# Patient Record
Sex: FEMALE | Race: WHITE | Employment: OTHER | URBAN - METROPOLITAN AREA
[De-identification: names, ages, dates, MRNs, and addresses within clinical notes are randomized per-mention and may not be internally consistent; named-entity substitution may affect disease eponyms.]

---

## 2017-06-06 ENCOUNTER — OFFICE VISIT (OUTPATIENT)
Dept: FAMILY MEDICINE | Facility: CLINIC | Age: 77
End: 2017-06-06
Payer: MEDICARE

## 2017-06-06 VITALS
DIASTOLIC BLOOD PRESSURE: 72 MMHG | TEMPERATURE: 97.6 F | OXYGEN SATURATION: 98 % | HEIGHT: 67 IN | WEIGHT: 170 LBS | SYSTOLIC BLOOD PRESSURE: 128 MMHG | BODY MASS INDEX: 26.68 KG/M2 | HEART RATE: 62 BPM

## 2017-06-06 DIAGNOSIS — D51.0 PERNICIOUS ANEMIA: ICD-10-CM

## 2017-06-06 DIAGNOSIS — G47.00 INSOMNIA, UNSPECIFIED TYPE: ICD-10-CM

## 2017-06-06 DIAGNOSIS — M62.81 GENERALIZED MUSCLE WEAKNESS: Primary | ICD-10-CM

## 2017-06-06 DIAGNOSIS — E55.9 VITAMIN D DEFICIENCY: ICD-10-CM

## 2017-06-06 LAB
ALBUMIN SERPL-MCNC: 3.7 G/DL (ref 3.4–5)
ALP SERPL-CCNC: 69 U/L (ref 40–150)
ALT SERPL W P-5'-P-CCNC: 18 U/L (ref 0–50)
ANION GAP SERPL CALCULATED.3IONS-SCNC: 3 MMOL/L (ref 3–14)
AST SERPL W P-5'-P-CCNC: 16 U/L (ref 0–45)
BILIRUB SERPL-MCNC: 0.7 MG/DL (ref 0.2–1.3)
BUN SERPL-MCNC: 20 MG/DL (ref 7–30)
CALCIUM SERPL-MCNC: 9.3 MG/DL (ref 8.5–10.1)
CHLORIDE SERPL-SCNC: 109 MMOL/L (ref 94–109)
CO2 SERPL-SCNC: 30 MMOL/L (ref 20–32)
CREAT SERPL-MCNC: 0.81 MG/DL (ref 0.52–1.04)
ERYTHROCYTE [DISTWIDTH] IN BLOOD BY AUTOMATED COUNT: 12.6 % (ref 10–15)
GFR SERPL CREATININE-BSD FRML MDRD: 68 ML/MIN/1.7M2
GLUCOSE SERPL-MCNC: 104 MG/DL (ref 70–99)
HCT VFR BLD AUTO: 41.2 % (ref 35–47)
HGB BLD-MCNC: 14.1 G/DL (ref 11.7–15.7)
MCH RBC QN AUTO: 33.7 PG (ref 26.5–33)
MCHC RBC AUTO-ENTMCNC: 34.2 G/DL (ref 31.5–36.5)
MCV RBC AUTO: 99 FL (ref 78–100)
PLATELET # BLD AUTO: 166 10E9/L (ref 150–450)
POTASSIUM SERPL-SCNC: 4 MMOL/L (ref 3.4–5.3)
PROT SERPL-MCNC: 7 G/DL (ref 6.8–8.8)
RBC # BLD AUTO: 4.18 10E12/L (ref 3.8–5.2)
SODIUM SERPL-SCNC: 142 MMOL/L (ref 133–144)
TSH SERPL DL<=0.005 MIU/L-ACNC: 3.13 MU/L (ref 0.4–4)
VIT B12 SERPL-MCNC: 336 PG/ML (ref 193–986)
WBC # BLD AUTO: 4.7 10E9/L (ref 4–11)

## 2017-06-06 PROCEDURE — 85027 COMPLETE CBC AUTOMATED: CPT | Performed by: PHYSICIAN ASSISTANT

## 2017-06-06 PROCEDURE — 84443 ASSAY THYROID STIM HORMONE: CPT | Performed by: PHYSICIAN ASSISTANT

## 2017-06-06 PROCEDURE — 80053 COMPREHEN METABOLIC PANEL: CPT | Performed by: PHYSICIAN ASSISTANT

## 2017-06-06 PROCEDURE — 99214 OFFICE O/P EST MOD 30 MIN: CPT | Performed by: PHYSICIAN ASSISTANT

## 2017-06-06 PROCEDURE — 82306 VITAMIN D 25 HYDROXY: CPT | Performed by: PHYSICIAN ASSISTANT

## 2017-06-06 PROCEDURE — 36415 COLL VENOUS BLD VENIPUNCTURE: CPT | Performed by: PHYSICIAN ASSISTANT

## 2017-06-06 PROCEDURE — 82607 VITAMIN B-12: CPT | Performed by: PHYSICIAN ASSISTANT

## 2017-06-06 NOTE — PATIENT INSTRUCTIONS
Insomnia  Insomnia refers to a difficulty going to sleep or staying asleep, or both. Insomnia has many causes, including anxiety, stress, depression, chronic pain, sleeping cycles out of balance due to working night shifts or excess napping during the day, and a condition called  sleep apnea . Insomnia can be a side effect from stimulant medicines such as decongestants, asthma inhalers and pills, diet pills, and illegal drugs such as speed, crank, crack, and PCP.  Home Care:  1. Review your medicines with your doctor or pharmacist to find out if they can cause insomnia.  2. Caffeine, smoking and alcohol also affect sleep. Limit your daily use and do not use these before bedtime. Alcohol may make you sleepy at first, but as its effects wear off, you may awaken a few hours later and have trouble returning to sleep.  3. Do not exercise, eat or drink large amounts of liquid within 2 hours of your bedtime.  4. Improve your sleep habits. Have a fixed bed and wake-up time. Try to keep noise, light and heat in your bedroom at a comfortable level. Try using earplugs or eyeshades if needed.   5. Avoid watching TV in bed.  6. If you do not fall asleep within 30 minutes, try to relax by reading or listening to soft music.  7. Limit daytime napping to one 30 minute period, early in the day.  8. Get regular exercise. Find other ways to lessen your stress level.  9. If a medicine was prescribed to help reset your sleep patterns, take it as directed. Sleeping pills are intended for short-term use, only. If taken for too long, the effect wears off while the risk of physical addiction and psychological dependence increases.  Follow-Up  with your doctor or as directed by our staff if you feel that your insomnia is not responding to the above measures.  Get Prompt Medical Attention  if any of the following occur:    Extreme restlessness or irritability    Confusion or hallucinations (seeing or hearing things that are not  there)    Anxiety, depression    Several days without sleeping    6528-1045 [employer]. [address] [place] [address], [place] [address]. All rights reserved. This information is not intended as a substitute for professional medical care. Always follow your healthcare professional's instructions.

## 2017-06-06 NOTE — PROGRESS NOTES
SUBJECTIVE:                                                    Barb Marie is a 77 year old female who presents to clinic today for the following health issues:    Medication Followup of cyanocobalamin (VITAMIN B12) 1000 MCG/ML injection    Taking Medication as prescribed: yes    Side Effects:  None    Medication Helping Symptoms:  Yes seems to help but having increased muscle weakness, feels unstable and weak at times, and legs collapse.  She has fallen at home, but can sense when it is coming - no fall with injury      Patient reported muscle weakness at last office visit as well.  States that symptoms have not worsened over time.  Notices that she has a hard time holding herself up if she sits without a backrest (such as when she tries sitting in the grass).  Also noticed leg weakness when she walks for longer periods of time. For example, [place] the [place] and suddenly had to sit down. If she rests for 5-10 minutes, she is able to get up and walk again and feels fine.  Denies numbness or tingling. No muscle aches or joint pains.    Activity level is pretty minimal during the winter. She is part of a sewing club, which involves a lot of sitting down, then standing up and walking inside. She does this 3-4 days per week.    Does go for walks fairly often in the summer.    States that she can sense when the weakness is coming on, and she will sit down to avoid a fall.    No CP, SOB, dizziness.    Insomnia  Duration of complaint: 1 year   Description:   Time to fall asleep (sleep latency): 30 minutes or more  Middle of night awakening:  YES  Early morning awakening:  YES  Progression of Symptoms:  same  Accompanying Signs & Symptoms:  Daytime sleepiness/napping: no. Rarely takes naps  Excessive snoring/apnea: States that she snores, but no known apnea  Restless legs: no  Frequent urination: no  Chronic pain:  no  History: Prior Insomnia: no  Precipitating factors:   New  stressful situation: no  Caffeine intake: no  OTC decongestants: no  Any new medications: no  Alleviating factors: Self medicating (alcohol, etc.):  not applicable  Therapies Tried and outcome: Getting out of bed when she can't fall asleep.    Reports having a hard time falling asleep.  If she is not asleep within 30 minutes, she will get up and go watch TV  Does watch TV before bed  Reports that if she can fall asleep pretty quickly she will get 5 hours of sleep at night. Gets up early to have breakfast with neighbor boy.  Rarely takes naps during the day.  States that she sleeps hard when she does sleep, but that she doesn't dream very often  Has experienced restless legs before, but states this happens very rarely. Gets muscle cramps at night approx 2-3 times per year  No nocturia    Problem list and histories reviewed & adjusted, as indicated.  Additional history: as documented    Patient Active Problem List   Diagnosis     Mixed hyperlipidemia     HYPERLIPIDEMIA LDL GOAL <130     Advanced directives, counseling/discussion     Pernicious anemia--Vitamin B12 injections monthly     Glaucoma associated with underlying disease     Past Surgical History:   Procedure Laterality Date     CHOLECYSTECTOMY  2008    At [facility]     TONSILLECTOMY & ADENOIDECTOMY      As a child       Social History   Substance Use Topics     Smoking status: Never Smoker     Smokeless tobacco: Never Used     Alcohol use No     Family History   Problem Relation Age of Onset     HEART DISEASE Father          Current Outpatient Prescriptions   Medication Sig Dispense Refill     cyanocobalamin (VITAMIN B12) 1000 MCG/ML injection Inject 1 mL (1,000 mcg) into the muscle every 30 days 12 mL 0     COSOPT 2-0.5 % OP SOLN instill 1 drop ou BID  0     Allergies   Allergen Reactions     Atorvastatin GI Disturbance     Pt is allergic to Lipitor.     Hmg-Coa-R Inhibitors      jaundice       ROS:  Constitutional, HEENT, cardiovascular,  "pulmonary, gi and gu systems are negative, except as otherwise noted.    OBJECTIVE:                                                    /72 (BP Location: Right arm, Patient Position: Chair, Cuff Size: Adult Regular)  Pulse 62  Temp 97.6  F (36.4  C) (Oral)  Ht 5' 7\" (1.702 m)  Wt 170 lb (77.1 kg)  SpO2 98%  BMI 26.63 kg/m2  Body mass index is 26.63 kg/(m^2).  GENERAL: healthy, alert and no distress  RESP: lungs clear to auscultation - no rales, rhonchi or wheezes  CV: regular rate and rhythm, normal S1 S2, no S3 or S4, no murmur, click or rub, no peripheral edema and peripheral pulses strong  MS: no gross musculoskeletal defects noted, no edema  PSYCH: mentation appears normal, affect normal/bright    Diagnostic Test Results:  No results found for this or any previous visit (from the past 24 hour(s)).     ASSESSMENT/PLAN:                                                      1. Generalized muscle weakness  Will check labs today. Possibly related to deconditioning. Encouraged her to continue walking, and to continue to sit down when she needs to. Discussed importance of avoiding falls.   - CBC with platelets  - Comprehensive metabolic panel (BMP + Alb, Alk Phos, ALT, AST, Total. Bili, TP)  - TSH with free T4 reflex  - Vitamin B12  - Vitamin D Deficiency    2. Vitamin D deficiency   - Vitamin D Deficiency    3. Insomnia, unspecified type  Reviewed sleep hygiene techniques. Television at night may be contributing to sleep issues. Not interested in medication at this time. Follow-up if symptoms do not improve with sleep hygiene techniques over the next month.    4. Pernicious anemia--Vitamin B12 injections monthly  Check B12 level today.     See Patient Instructions    Janny Martino PA-C  [facility]      "

## 2017-06-06 NOTE — NURSING NOTE
"Chief Complaint   Patient presents with     Recheck Medication     Initial /72 (BP Location: Right arm, Patient Position: Chair, Cuff Size: Adult Regular)  Pulse 62  Temp 97.6  F (36.4  C) (Oral)  Ht 5' 7\" (1.702 m)  Wt 170 lb (77.1 kg)  SpO2 98%  BMI 26.63 kg/m2 Estimated body mass index is 26.63 kg/(m^2) as calculated from the following:    Height as of this encounter: 5' 7\" (1.702 m).    Weight as of this encounter: 170 lb (77.1 kg).  BP completed using cuff size regular right Arm  Tatyanayanet Galan CMA    "

## 2017-06-06 NOTE — LETTER
[facility]     [address] [place] [address]                                           [phone]   June 16, 2017    Barb Densonpp  [address] [place] [address]      Dear Barb,    Here is a summary of your recent test results:    Liver and gallbladder tests are normal. (ALT,AST, Alk phos, bilirubin), kidney function is normal (Cr, GFR), Sodium is normal, Potassium is normal, Calcium is normal, Glucose is slightly elevated.   -TSH (thyroid stimulating hormone) level is normal which indicates normal thyroid function.   -Normal red blood cell (hgb) levels, normal white blood cell count and normal platelet levels.   -Vitamin D is at the low end of normal. This is often called Vitamin D insufficiency. I recommend taking 2,000-4,000 units of an over-the-counter Vitamin D3 supplement once daily to improve your level.   -Vitamin B12 level is normal. Please continue with your monthly B12 injections.     Your test results are enclosed.      Thank you for choosing [facility].  We appreciate the opportunity to serve you and look forward to supporting your healthcare needs in the future.    If you have any questions or concerns, please call me or my staff at [phone].    Best regards,  LEANDRA Monterroso      Results for orders placed or performed in visit on 06/06/17   CBC with platelets   Result Value Ref Range    WBC 4.7 4.0 - 11.0 10e9/L    RBC Count 4.18 3.8 - 5.2 10e12/L    Hemoglobin 14.1 11.7 - 15.7 g/dL    Hematocrit 41.2 35.0 - 47.0 %    MCV 99 78 - 100 fl    MCH 33.7 (H) 26.5 - 33.0 pg    MCHC 34.2 31.5 - 36.5 g/dL    RDW 12.6 10.0 - 15.0 %    Platelet Count 166 150 - 450 10e9/L   Comprehensive metabolic panel (BMP + Alb, Alk Phos, ALT, AST, Total. Bili, TP)   Result Value Ref Range    Sodium 142 133 - 144 mmol/L    Potassium 4.0 3.4 - 5.3 mmol/L    Chloride 109 94 - 109 mmol/L    Carbon Dioxide 30 20 - 32 mmol/L    Anion Gap 3 3 - 14 mmol/L     Glucose 104 (H) 70 - 99 mg/dL    Urea Nitrogen 20 7 - 30 mg/dL    Creatinine 0.81 0.52 - 1.04 mg/dL    GFR Estimate 68 >60 mL/min/1.7m2    GFR Estimate If Black 82 >60 mL/min/1.7m2    Calcium 9.3 8.5 - 10.1 mg/dL    Bilirubin Total 0.7 0.2 - 1.3 mg/dL    Albumin 3.7 3.4 - 5.0 g/dL    Protein Total 7.0 6.8 - 8.8 g/dL    Alkaline Phosphatase 69 40 - 150 U/L    ALT 18 0 - 50 U/L    AST 16 0 - 45 U/L   TSH with free T4 reflex   Result Value Ref Range    TSH 3.13 0.40 - 4.00 mU/L   Vitamin B12   Result Value Ref Range    Vitamin B12 336 193 - 986 pg/mL   Vitamin D Deficiency   Result Value Ref Range    Vitamin D Deficiency screening 25 20 - 75 ug/L

## 2017-06-06 NOTE — MR AVS SNAPSHOT
After Visit Summary   6/6/2017    Barb Maire    MRN: [id number]           Patient Information     Date Of Birth          1940        Visit Information        Provider Department      6/6/2017 10:00 AM Janny Martino PA-C [facility] [place]        Today's Diagnoses     Generalized muscle weakness    -  1      Care Instructions      Insomnia  Insomnia refers to a difficulty going to sleep or staying asleep, or both. Insomnia has many causes, including anxiety, stress, depression, chronic pain, sleeping cycles out of balance due to working night shifts or excess napping during the day, and a condition called  sleep apnea . Insomnia can be a side effect from stimulant medicines such as decongestants, asthma inhalers and pills, diet pills, and illegal drugs such as speed, crank, crack, and PCP.  Home Care:  1. Review your medicines with your doctor or pharmacist to find out if they can cause insomnia.  2. Caffeine, smoking and alcohol also affect sleep. Limit your daily use and do not use these before bedtime. Alcohol may make you sleepy at first, but as its effects wear off, you may awaken a few hours later and have trouble returning to sleep.  3. Do not exercise, eat or drink large amounts of liquid within 2 hours of your bedtime.  4. Improve your sleep habits. Have a fixed bed and wake-up time. Try to keep noise, light and heat in your bedroom at a comfortable level. Try using earplugs or eyeshades if needed.   5. Avoid watching TV in bed.  6. If you do not fall asleep within 30 minutes, try to relax by reading or listening to soft music.  7. Limit daytime napping to one 30 minute period, early in the day.  8. Get regular exercise. Find other ways to lessen your stress level.  9. If a medicine was prescribed to help reset your sleep patterns, take it as directed. Sleeping pills are intended for short-term use, only. If taken for too long, the effect wears off while the risk of  "physical addiction and psychological dependence increases.  Follow-Up  with your doctor or as directed by our staff if you feel that your insomnia is not responding to the above measures.  Get Prompt Medical Attention  if any of the following occur:    Extreme restlessness or irritability    Confusion or hallucinations (seeing or hearing things that are not there)    Anxiety, depression    Several days without sleeping    6059-6074 [employer]. [address] [place] [address]. All rights reserved. This information is not intended as a substitute for professional medical care. Always follow your healthcare professional's instructions.                Follow-ups after your visit        Who to contact     If you have questions or need follow up information about today's clinic visit or your schedule please contact [facility] [place] directly at [phone].  Normal or non-critical lab and imaging results will be communicated to you by MyChart, letter or phone within 4 business days after the clinic has received the results. If you do not hear from us within 7 days, please contact the clinic through [employer] or phone. If you have a critical or abnormal lab result, we will notify you by phone as soon as possible.  Submit refill requests through [employer] or call your pharmacy and they will forward the refill request to us. Please allow 3 business days for your refill to be completed.          Additional Information About Your Visit        [employer] Information     [employer] lets you send messages to your doctor, view your test results, renew your prescriptions, schedule appointments and more. To sign up, go to [email or web address] . Click on \"Log in\" on the left side of the screen, which will take you to the Welcome page. Then click on \"Sign up Now\" on the right side of the page.     You will be asked to enter the access code listed below, as well as some personal information. Please follow the " "directions to create your username and password.     Your access code is: [email or web address]  Expires: 2017 10:39 AM     Your access code will  in 90 days. If you need help or a new code, please call your [facility] clinic or [phone].        Care EveryWhere ID     This is your Care EveryWhere ID. This could be used by other organizations to access your [facility] medical records  [id number]        Your Vitals Were     Pulse Temperature Height Pulse Oximetry BMI (Body Mass Index)       62 97.6  F (36.4  C) (Oral) 5' 7\" (1.702 m) 98% 26.63 kg/m2        Blood Pressure from Last 3 Encounters:   17 128/72   16 124/74   16 114/64    Weight from Last 3 Encounters:   17 170 lb (77.1 kg)   16 169 lb (76.7 kg)   16 167 lb (75.8 kg)              Today, you had the following     No orders found for display       Primary Care Provider    [facility]       No address on file        Thank you!     Thank you for choosing [facility]  for your care. Our goal is always to provide you with excellent care. Hearing back from our patients is one way we can continue to improve our services. Please take a few minutes to complete the written survey that you may receive in the mail after your visit with us. Thank you!             Your Updated Medication List - Protect others around you: Learn how to safely use, store and throw away your medicines at www.disposemymeds.org.          This list is accurate as of: 17 10:39 AM.  Always use your most recent med list.                   Brand Name Dispense Instructions for use    COSOPT 2-0.5 % ophthalmic solution   Generic drug:  dorzolamide-timolol      instill 1 drop ou BID       cyanocobalamin 1000 MCG/ML injection    VITAMIN B12    12 mL    Inject 1 mL (1,000 mcg) into the muscle every 30 days         "

## 2017-06-07 LAB — DEPRECATED CALCIDIOL+CALCIFEROL SERPL-MC: 25 UG/L (ref 20–75)

## 2017-06-16 NOTE — PROGRESS NOTES
Please send the following letter:    Dear Barb,    -Liver and gallbladder tests are normal. (ALT,AST, Alk phos, bilirubin), kidney function is normal (Cr, GFR), Sodium is normal, Potassium is normal, Calcium is normal, Glucose is slightly elevated.  -TSH (thyroid stimulating hormone) level is normal which indicates normal thyroid function.  -Normal red blood cell (hgb) levels, normal white blood cell count and normal platelet levels.  -Vitamin D is at the low end of normal. This is often called Vitamin D insufficiency. I recommend taking 2,000-4,000 units of an over-the-counter Vitamin D3 supplement once daily to improve your level.  -Vitamin B12 level is normal. Please continue with your monthly B12 injections.    If you have further questions about the interpretation of your labs, [email or web address] is a good website to check out for further information.      Please contact the clinic if you have additional questions.  Thank you.    Sincerely,    Janny Martino PA-C

## 2017-06-26 DIAGNOSIS — D51.0 PERNICIOUS ANEMIA: ICD-10-CM

## 2017-06-26 RX ORDER — CYANOCOBALAMIN 1000 UG/ML
1 INJECTION, SOLUTION INTRAMUSCULAR; SUBCUTANEOUS
Qty: 12 ML | Refills: 0 | Status: SHIPPED | OUTPATIENT
Start: 2017-06-26 | End: 2018-06-12

## 2017-06-26 NOTE — TELEPHONE ENCOUNTER
cyanocobalamin        Last Written Prescription Date: 6-21-16  Last Fill Quantity: 12ml,    # refills: 0  Last Office Visit with [facility] or [facility] prescribing provider:  6-6-17      Lab Results   Component Value Date    WBC 4.7 06/06/2017     Lab Results   Component Value Date    RBC 4.18 06/06/2017     Lab Results   Component Value Date    HGB 14.1 06/06/2017     Lab Results   Component Value Date    HCT 41.2 06/06/2017     No components found for: MCT  Lab Results   Component Value Date    MCV 99 06/06/2017     Lab Results   Component Value Date    MCH 33.7 06/06/2017     Lab Results   Component Value Date    MCHC 34.2 06/06/2017     Lab Results   Component Value Date    RDW 12.6 06/06/2017     Lab Results   Component Value Date     06/06/2017     Lab Results   Component Value Date    AST 16 06/06/2017     Lab Results   Component Value Date    ALT 18 06/06/2017     Creatinine   Date Value Ref Range Status   06/06/2017 0.81 0.52 - 1.04 mg/dL Final

## 2017-06-26 NOTE — TELEPHONE ENCOUNTER
Reason for Call:  Barb is calling to make sure we rec'vd the refill request for her Vitamin B. She thought it would be ready for her last week, but I couldn't find any notes requesting it. She would like the Rx to be for a full year.    Best phone number to reach pt at is: [phone], no way to leave message    Pharmacy preferred (if calling for a refill): [facility] - updated    Sylwia Schneider  Patient Representative

## 2018-06-12 ENCOUNTER — OFFICE VISIT (OUTPATIENT)
Dept: FAMILY MEDICINE | Facility: CLINIC | Age: 78
End: 2018-06-12
Payer: MEDICARE

## 2018-06-12 VITALS
HEART RATE: 59 BPM | HEIGHT: 67 IN | TEMPERATURE: 98.1 F | DIASTOLIC BLOOD PRESSURE: 72 MMHG | OXYGEN SATURATION: 97 % | SYSTOLIC BLOOD PRESSURE: 136 MMHG | BODY MASS INDEX: 26.06 KG/M2 | WEIGHT: 166 LBS

## 2018-06-12 DIAGNOSIS — E55.9 VITAMIN D DEFICIENCY: ICD-10-CM

## 2018-06-12 DIAGNOSIS — R60.0 LOWER EXTREMITY EDEMA: ICD-10-CM

## 2018-06-12 DIAGNOSIS — D51.0 PERNICIOUS ANEMIA: Primary | ICD-10-CM

## 2018-06-12 DIAGNOSIS — E78.5 HYPERLIPIDEMIA LDL GOAL <130: ICD-10-CM

## 2018-06-12 PROCEDURE — 99214 OFFICE O/P EST MOD 30 MIN: CPT | Performed by: PHYSICIAN ASSISTANT

## 2018-06-12 NOTE — MR AVS SNAPSHOT
"              After Visit Summary   6/12/2018    Barb Marie    MRN: [id number]           Patient Information     Date Of Birth          1940        Visit Information        Provider Department      6/12/2018 11:20 AM Renetta Scruggs PA-C [facility]        Today's Diagnoses     Pernicious anemia--Vitamin B12 injections monthly    -  1    Vitamin D deficiency        Lower extremity edema        Hyperlipidemia LDL goal <130           Follow-ups after your visit        Who to contact     If you have questions or need follow up information about today's clinic visit or your schedule please contact [facility] [place] directly at [phone].  Normal or non-critical lab and imaging results will be communicated to you by MyChart, letter or phone within 4 business days after the clinic has received the results. If you do not hear from us within 7 days, please contact the clinic through MyChart or phone. If you have a critical or abnormal lab result, we will notify you by phone as soon as possible.  Submit refill requests through [email or web address] or call your pharmacy and they will forward the refill request to us. Please allow 3 business days for your refill to be completed.          Additional Information About Your Visit        Care EveryWhere ID     This is your Care EveryWhere ID. This could be used by other organizations to access your [facility] medical records  [id number]        Your Vitals Were     Pulse Temperature Height Pulse Oximetry BMI (Body Mass Index)       59 98.1  F (36.7  C) (Oral) 5' 7\" (1.702 m) 97% 26 kg/m2        Blood Pressure from Last 3 Encounters:   06/12/18 136/72   06/06/17 128/72   11/29/16 124/74    Weight from Last 3 Encounters:   06/12/18 166 lb (75.3 kg)   06/06/17 170 lb (77.1 kg)   11/29/16 169 lb (76.7 kg)                 Where to get your medicines      These medications were sent to [id number] - [place], [place] - [address] [address] AT " [facility] [address] [address], [place] [address]    Hours:  24-hours Phone:  [phone]     cyanocobalamin 1000 MCG/ML injection          Primary Care Provider Office Phone # Fax #    [facility] [phone] [address]  [facility] [place], [place] [address]        Equal Access to Services     JOSUE MENDOZA : Hadii aad ku hadasho Soomaali, waaxda luqadaha, qaybta kaalmada adeegyada, waxay idiin hayaan adeeg khlinksh la'aan ah. So [facility] [phone].    ATENCIÓN: Si habla español, tiene a fajardo disposición servicios gratuitos de asistencia lingüística. Antonella al [phone].    We comply with applicable federal civil rights laws and [place] laws. We do not discriminate on the basis of race, color, national origin, age, disability, sex, sexual orientation, or gender identity.            Thank you!     Thank you for choosing [facility]  for your care. Our goal is always to provide you with excellent care. Hearing back from our patients is one way we can continue to improve our services. Please take a few minutes to complete the written survey that you may receive in the mail after your visit with us. Thank you!             Your Updated Medication List - Protect others around you: Learn how to safely use, store and throw away your medicines at www.disposemymeds.org.          This list is accurate as of 6/12/18 11:59 PM.  Always use your most recent med list.                   Brand Name Dispense Instructions for use Diagnosis    COSOPT 2-0.5 % ophthalmic solution   Generic drug:  dorzolamide-timolol      instill 1 drop ou BID        cyanocobalamin 1000 MCG/ML injection    VITAMIN B12    12 mL    Inject 1 mL (1,000 mcg) into the muscle every 30 days    Pernicious anemia       VITAMIN D (CHOLECALCIFEROL) PO      Take 1,000 Units by mouth daily

## 2018-06-12 NOTE — NURSING NOTE
"Chief Complaint   Patient presents with     Establish Care     Refill Request     Vitamin B12 refill    /72 (BP Location: Right arm, Cuff Size: Adult Regular)  Pulse 59  Temp 98.1  F (36.7  C) (Oral)  Ht 5' 7\" (1.702 m)  Wt 166 lb (75.3 kg)  SpO2 97%  BMI 26 kg/m2 Body Mass Index is Body mass index is 26 kg/(m^2).  BP completed using cuff size : regular right arm  Jacquelin Austin MA        "

## 2018-06-12 NOTE — PROGRESS NOTES
"  SUBJECTIVE:   Barb Marie is a 78 year old female who presents to clinic today for the following health issues:      Medication Followup of b12 - note completed after pt left the clinic due to clinic power outage.    Pt reported hx of B12 deficiency for about 20 yrs. Reports this was discovered during work-up when she \"wasn't feeling good\" and had a syncopal episode. Has been on monthly B12 injections since then. Got so comfortable with this that she started to do her own injections.  States she feels great and is very healthy for her age otherwise.    Hx of glaucoma for which she is followed by ophthalmology.  Hx of vitamin D deficiency and also takes a supplement for this. Believes it's 1000 units daily, but she will check her bottle and let us know.     Incidentally noted to have 1+ bilaterally LE edema on exam today. Pt states its a bit worse than what it is usually so doesn't typically notice it. Can be worse if she eats more salt, but usually is not a problem. No trial of compression stockings or treatment to date. She originally had declined further labs excluding B12 and vitamin D, but was able to checking her renal function, TSH, CMP after further discussing her swelling. She will trial wearing compression stockings and return for re-check in 1-2 months.      Taking Medication as prescribed: yes    Side Effects:  None    Medication Helping Symptoms:  yes       Problem list and histories reviewed & adjusted, as indicated.  Additional history: as documented    Patient Active Problem List   Diagnosis     Mixed hyperlipidemia     HYPERLIPIDEMIA LDL GOAL <130     Advanced directives, counseling/discussion     Pernicious anemia--Vitamin B12 injections monthly     Glaucoma associated with underlying disease     Past Surgical History:   Procedure Laterality Date     CHOLECYSTECTOMY  2008    At [facility]     TONSILLECTOMY & ADENOIDECTOMY      As a child       Social History   Substance Use " "Topics     Smoking status: Never Smoker     Smokeless tobacco: Never Used     Alcohol use No     Family History   Problem Relation Age of Onset     HEART DISEASE Father          Current Outpatient Prescriptions   Medication Sig Dispense Refill     cyanocobalamin (VITAMIN B12) 1000 MCG/ML injection Inject 1 mL (1,000 mcg) into the muscle every 30 days 12 mL 0     VITAMIN D, CHOLECALCIFEROL, PO Take 1,000 Units by mouth daily       COSOPT 2-0.5 % OP SOLN instill 1 drop ou BID  0     Allergies   Allergen Reactions     Atorvastatin GI Disturbance     Pt is allergic to Lipitor.     Hmg-Coa-R Inhibitors      jaundice       Reviewed and updated as needed this visit by clinical staff  Allergies  Meds       Reviewed and updated as needed this visit by Provider         ROS:  Constitutional, HEENT, cardiovascular, pulmonary, gi and gu systems are negative, except as otherwise noted.    OBJECTIVE:     /72 (BP Location: Right arm, Cuff Size: Adult Regular)  Pulse 59  Temp 98.1  F (36.7  C) (Oral)  Ht 5' 7\" (1.702 m)  Wt 166 lb (75.3 kg)  SpO2 97%  BMI 26 kg/m2  Body mass index is 26 kg/(m^2).  GENERAL: healthy, alert and no distress  RESP: lungs clear to auscultation - no rales, rhonchi or wheezes  CV: regular rates and rhythm and no murmur, click or rub  EXT: bilateral 1+ LE edema.     Diagnostic Test Results:  none     ASSESSMENT/PLAN:       ICD-10-CM    1. Pernicious anemia--Vitamin B12 injections monthly D51.0 cyanocobalamin (VITAMIN B12) 1000 MCG/ML injection     **Vitamin B12 FUTURE 2mo     **CBC with platelets FUTURE 2mo     **Folate FUTURE 2mo   2. Vitamin D deficiency E55.9 **Vitamin D Deficiency FUTURE 2mo   3. Lower extremity edema R60.0 **Comprehensive metabolic panel FUTURE 2mo     **TSH with free T4 reflex FUTURE 2mo     **CBC with platelets FUTURE 2mo   4. Hyperlipidemia LDL goal <130 E78.5 Lipid panel reflex to direct LDL Fasting   Note completed after pt left the clinic due to clinic power outage " "so there was no access to EPIC or lab at time of appt.  Very stable on B12 injections for history of pernicious anemia for past 20 yrs such that pt gives her own injections at home.  Will repeat labs for stability and refill for 1 yr.  Incidentally noted LE edema on exam and pt originally declined further labs assessment, but after review of causes for LE swelling was willing to have renal function rechecked in addition to routine preventative labs as noted above.  Will have her start with a trial of compression stockings as she otherwise reports to feel \"great\" and check in on how she is doing in 1-2 months.  Can consider further work-up with echo to assess for any heart failure if on-going concerns and renal function is normal.  Pt in agreement with plan.     Renetta Scruggs PA-C  [facility] [place]    "

## 2018-06-14 RX ORDER — CYANOCOBALAMIN 1000 UG/ML
1 INJECTION, SOLUTION INTRAMUSCULAR; SUBCUTANEOUS
Qty: 12 ML | Refills: 0 | Status: SHIPPED | OUTPATIENT
Start: 2018-06-14 | End: 2019-06-14

## 2018-06-20 DIAGNOSIS — E78.5 HYPERLIPIDEMIA LDL GOAL <130: ICD-10-CM

## 2018-06-20 DIAGNOSIS — D51.0 PERNICIOUS ANEMIA: ICD-10-CM

## 2018-06-20 DIAGNOSIS — E55.9 VITAMIN D DEFICIENCY: ICD-10-CM

## 2018-06-20 DIAGNOSIS — R60.0 LOWER EXTREMITY EDEMA: ICD-10-CM

## 2018-06-20 LAB
ALBUMIN SERPL-MCNC: 3.5 G/DL (ref 3.4–5)
ALP SERPL-CCNC: 76 U/L (ref 40–150)
ALT SERPL W P-5'-P-CCNC: 21 U/L (ref 0–50)
ANION GAP SERPL CALCULATED.3IONS-SCNC: 9 MMOL/L (ref 3–14)
AST SERPL W P-5'-P-CCNC: 19 U/L (ref 0–45)
BILIRUB SERPL-MCNC: 0.8 MG/DL (ref 0.2–1.3)
BUN SERPL-MCNC: 15 MG/DL (ref 7–30)
CALCIUM SERPL-MCNC: 9.3 MG/DL (ref 8.5–10.1)
CHLORIDE SERPL-SCNC: 106 MMOL/L (ref 94–109)
CHOLEST SERPL-MCNC: 147 MG/DL
CO2 SERPL-SCNC: 27 MMOL/L (ref 20–32)
CREAT SERPL-MCNC: 0.86 MG/DL (ref 0.52–1.04)
DEPRECATED CALCIDIOL+CALCIFEROL SERPL-MC: 39 UG/L (ref 20–75)
ERYTHROCYTE [DISTWIDTH] IN BLOOD BY AUTOMATED COUNT: 12.5 % (ref 10–15)
FOLATE SERPL-MCNC: 39.5 NG/ML
GFR SERPL CREATININE-BSD FRML MDRD: 63 ML/MIN/1.7M2
GLUCOSE SERPL-MCNC: 102 MG/DL (ref 70–99)
HCT VFR BLD AUTO: 39.7 % (ref 35–47)
HDLC SERPL-MCNC: 46 MG/DL
HGB BLD-MCNC: 13.9 G/DL (ref 11.7–15.7)
LDLC SERPL CALC-MCNC: 86 MG/DL
MCH RBC QN AUTO: 34.4 PG (ref 26.5–33)
MCHC RBC AUTO-ENTMCNC: 35 G/DL (ref 31.5–36.5)
MCV RBC AUTO: 98 FL (ref 78–100)
NONHDLC SERPL-MCNC: 101 MG/DL
PLATELET # BLD AUTO: 166 10E9/L (ref 150–450)
POTASSIUM SERPL-SCNC: 3.8 MMOL/L (ref 3.4–5.3)
PROT SERPL-MCNC: 7 G/DL (ref 6.8–8.8)
RBC # BLD AUTO: 4.04 10E12/L (ref 3.8–5.2)
SODIUM SERPL-SCNC: 142 MMOL/L (ref 133–144)
T4 FREE SERPL-MCNC: 0.99 NG/DL (ref 0.76–1.46)
TRIGL SERPL-MCNC: 73 MG/DL
TSH SERPL DL<=0.005 MIU/L-ACNC: 4.02 MU/L (ref 0.4–4)
VIT B12 SERPL-MCNC: 343 PG/ML (ref 193–986)
WBC # BLD AUTO: 5.5 10E9/L (ref 4–11)

## 2018-06-20 PROCEDURE — 84443 ASSAY THYROID STIM HORMONE: CPT | Performed by: PHYSICIAN ASSISTANT

## 2018-06-20 PROCEDURE — 82746 ASSAY OF FOLIC ACID SERUM: CPT | Performed by: PHYSICIAN ASSISTANT

## 2018-06-20 PROCEDURE — 80061 LIPID PANEL: CPT | Performed by: PHYSICIAN ASSISTANT

## 2018-06-20 PROCEDURE — 80053 COMPREHEN METABOLIC PANEL: CPT | Performed by: PHYSICIAN ASSISTANT

## 2018-06-20 PROCEDURE — 82607 VITAMIN B-12: CPT | Performed by: PHYSICIAN ASSISTANT

## 2018-06-20 PROCEDURE — 84439 ASSAY OF FREE THYROXINE: CPT | Performed by: PHYSICIAN ASSISTANT

## 2018-06-20 PROCEDURE — 82306 VITAMIN D 25 HYDROXY: CPT | Performed by: PHYSICIAN ASSISTANT

## 2018-06-20 PROCEDURE — 36415 COLL VENOUS BLD VENIPUNCTURE: CPT | Performed by: PHYSICIAN ASSISTANT

## 2018-06-20 PROCEDURE — 85027 COMPLETE CBC AUTOMATED: CPT | Performed by: PHYSICIAN ASSISTANT

## 2018-06-20 NOTE — LETTER
June 21, 2018      Barb Barker Frank  [address] [place] [address]        Dear Ms.Frank,    We are writing to inform you of your test results.    -Liver and gallbladder tests (ALT,AST, Alk phos,bilirubin) are normal.  -Kidney function (GFR) is normal.  -Sodium is normal.  -Potassium is normal.  -Glucose is slight elevated and may be sign of early diabetes (prediabetes). ADVISE:: low carbohydrate diet, exercise, try to lose weight (if necessary) and recheck glucose in 12 months. (GLU,A1C, DX: prediabetes)  -LDL(bad) cholesterol and trigylceride levels are normal.  -HDL(good) cholesterol level is low which can increase your heart disease risk.  A diet high in fat and simple carbohydrates, genetics and being overweight can contribute to this.   ADVISE: a regular exercise program with at least 30 minutes of aerobic exercise 3-4 days/week ( 45 minutes 4-6 days/week if weight loss needed), and omega-3 fatty acids (fish oil) 2565-9400 mg daily are helpful to improve this.  Rechecking your cholesterol in 12 months is recommended (LIPID w/ LDL reflex, DX: low HDL).  -TSH (thyroid stimulating hormone) level is normal which indicates normal thyroid function.  -Normal red blood cell (hgb) levels, normal white blood cell count and normal platelet levels.  -Vitamin D level is normal, continue your supplement.  -Vitamin B12 level is normal, continue your injections.  -Folate level is normal.    Follow-up as previously discussed in clinic.    Resulted Orders   **Vitamin B12 FUTURE 2mo   Result Value Ref Range    Vitamin B12 343 193 - 986 pg/mL   **Vitamin D Deficiency FUTURE 2mo   Result Value Ref Range    Vitamin D Deficiency screening 39 20 - 75 ug/L      Comment:      Season, race, dietary intake, and treatment affect the concentration of   25-hydroxy-Vitamin D. Values may decrease during winter months and increase   during summer months. Values 20-29 ug/L may indicate Vitamin D insufficiency   and values <20 ug/L  may indicate Vitamin D deficiency.  Vitamin D determination is routinely performed by an immunoassay specific for   25 hydroxyvitamin D3.  If an individual is on vitamin D2 (ergocalciferol)   supplementation, please specify 25 OH vitamin D2 and D3 level determination by   LCMSMS test [id number].     **Comprehensive metabolic panel FUTURE 2mo   Result Value Ref Range    Sodium 142 133 - 144 mmol/L    Potassium 3.8 3.4 - 5.3 mmol/L    Chloride 106 94 - 109 mmol/L    Carbon Dioxide 27 20 - 32 mmol/L    Anion Gap 9 3 - 14 mmol/L    Glucose 102 (H) 70 - 99 mg/dL      Comment:      Fasting specimen    Urea Nitrogen 15 7 - 30 mg/dL    Creatinine 0.86 0.52 - 1.04 mg/dL    GFR Estimate 63 >60 mL/min/1.7m2      Comment:      Non  GFR Calc    GFR Estimate If Black 76 >60 mL/min/1.7m2      Comment:       GFR Calc    Calcium 9.3 8.5 - 10.1 mg/dL    Bilirubin Total 0.8 0.2 - 1.3 mg/dL    Albumin 3.5 3.4 - 5.0 g/dL    Protein Total 7.0 6.8 - 8.8 g/dL    Alkaline Phosphatase 76 40 - 150 U/L    ALT 21 0 - 50 U/L    AST 19 0 - 45 U/L   Lipid panel reflex to direct LDL Fasting   Result Value Ref Range    Cholesterol 147 <200 mg/dL    Triglycerides 73 <150 mg/dL      Comment:      Fasting specimen    HDL Cholesterol 46 (L) >49 mg/dL    LDL Cholesterol Calculated 86 <100 mg/dL      Comment:      Desirable:       <100 mg/dl    Non HDL Cholesterol 101 <130 mg/dL   **TSH with free T4 reflex FUTURE 2mo   Result Value Ref Range    TSH 4.02 (H) 0.40 - 4.00 mU/L   **CBC with platelets FUTURE 2mo   Result Value Ref Range    WBC 5.5 4.0 - 11.0 10e9/L    RBC Count 4.04 3.8 - 5.2 10e12/L    Hemoglobin 13.9 11.7 - 15.7 g/dL    Hematocrit 39.7 35.0 - 47.0 %    MCV 98 78 - 100 fl    MCH 34.4 (H) 26.5 - 33.0 pg    MCHC 35.0 31.5 - 36.5 g/dL    RDW 12.5 10.0 - 15.0 %    Platelet Count 166 150 - 450 10e9/L   **Folate FUTURE 2mo   Result Value Ref Range    Folate 39.5 >5.4 ng/mL   T4 free   Result Value Ref Range    T4 Free  0.99 0.76 - 1.46 ng/dL       If you have any questions or concerns, please call the clinic at the number listed above.       Sincerely,        Renetta Scruggs PA-C

## 2018-06-21 NOTE — PROGRESS NOTES
Please call or write patient with the following results:    -Liver and gallbladder tests (ALT,AST, Alk phos,bilirubin) are normal.  -Kidney function (GFR) is normal.  -Sodium is normal.  -Potassium is normal.  -Glucose is slight elevated and may be sign of early diabetes (prediabetes). ADVISE:: low carbohydrate diet, exercise, try to lose weight (if necessary) and recheck glucose in 12 months. (GLU,A1C, DX: prediabetes)  -LDL(bad) cholesterol and trigylceride levels are normal.  -HDL(good) cholesterol level is low which can increase your heart disease risk.  A diet high in fat and simple carbohydrates, genetics and being overweight can contribute to this.   ADVISE: a regular exercise program with at least 30 minutes of aerobic exercise 3-4 days/week ( 45 minutes 4-6 days/week if weight loss needed), and omega-3 fatty acids (fish oil) 6414-3507 mg daily are helpful to improve this.  Rechecking your cholesterol in 12 months is recommended (LIPID w/ LDL reflex, DX: low HDL).  -TSH (thyroid stimulating hormone) level is normal which indicates normal thyroid function.  -Normal red blood cell (hgb) levels, normal white blood cell count and normal platelet levels.  -Vitamin D level is normal, continue your supplement.  -Vitamin B12 level is normal, continue your injections.  -Folate level is normal.    Follow-up as previously discussed in clinic.      Electronically Signed By: Renetta Scruggs PA-C

## 2019-06-14 ENCOUNTER — OFFICE VISIT (OUTPATIENT)
Dept: FAMILY MEDICINE | Facility: CLINIC | Age: 79
End: 2019-06-14
Payer: MEDICARE

## 2019-06-14 VITALS
DIASTOLIC BLOOD PRESSURE: 72 MMHG | RESPIRATION RATE: 14 BRPM | HEIGHT: 67 IN | HEART RATE: 94 BPM | TEMPERATURE: 99 F | WEIGHT: 160 LBS | BODY MASS INDEX: 25.11 KG/M2 | SYSTOLIC BLOOD PRESSURE: 110 MMHG | OXYGEN SATURATION: 98 %

## 2019-06-14 DIAGNOSIS — E78.2 MIXED HYPERLIPIDEMIA: ICD-10-CM

## 2019-06-14 DIAGNOSIS — Z00.00 ENCOUNTER FOR MEDICARE ANNUAL WELLNESS EXAM: Primary | ICD-10-CM

## 2019-06-14 DIAGNOSIS — E55.9 VITAMIN D DEFICIENCY: ICD-10-CM

## 2019-06-14 DIAGNOSIS — H42 GLAUCOMA OF BOTH EYES ASSOCIATED WITH UNDERLYING DISEASE: ICD-10-CM

## 2019-06-14 DIAGNOSIS — R19.5 LOOSE STOOLS: ICD-10-CM

## 2019-06-14 DIAGNOSIS — D51.0 PERNICIOUS ANEMIA: ICD-10-CM

## 2019-06-14 LAB
ALBUMIN SERPL-MCNC: 3.8 G/DL (ref 3.4–5)
ALP SERPL-CCNC: 80 U/L (ref 40–150)
ALT SERPL W P-5'-P-CCNC: 23 U/L (ref 0–50)
ANION GAP SERPL CALCULATED.3IONS-SCNC: 7 MMOL/L (ref 3–14)
AST SERPL W P-5'-P-CCNC: 26 U/L (ref 0–45)
BILIRUB SERPL-MCNC: 0.8 MG/DL (ref 0.2–1.3)
BUN SERPL-MCNC: 21 MG/DL (ref 7–30)
CALCIUM SERPL-MCNC: 8.9 MG/DL (ref 8.5–10.1)
CHLORIDE SERPL-SCNC: 105 MMOL/L (ref 94–109)
CO2 SERPL-SCNC: 25 MMOL/L (ref 20–32)
CREAT SERPL-MCNC: 0.94 MG/DL (ref 0.52–1.04)
ERYTHROCYTE [DISTWIDTH] IN BLOOD BY AUTOMATED COUNT: 13.2 % (ref 10–15)
FOLATE SERPL-MCNC: 27.3 NG/ML
GFR SERPL CREATININE-BSD FRML MDRD: 57 ML/MIN/{1.73_M2}
GLUCOSE SERPL-MCNC: 135 MG/DL (ref 70–99)
HCT VFR BLD AUTO: 45.7 % (ref 35–47)
HGB BLD-MCNC: 15.7 G/DL (ref 11.7–15.7)
MCH RBC QN AUTO: 33.6 PG (ref 26.5–33)
MCHC RBC AUTO-ENTMCNC: 34.4 G/DL (ref 31.5–36.5)
MCV RBC AUTO: 98 FL (ref 78–100)
PLATELET # BLD AUTO: 194 10E9/L (ref 150–450)
POTASSIUM SERPL-SCNC: 3.4 MMOL/L (ref 3.4–5.3)
PROT SERPL-MCNC: 7.6 G/DL (ref 6.8–8.8)
RBC # BLD AUTO: 4.67 10E12/L (ref 3.8–5.2)
SODIUM SERPL-SCNC: 137 MMOL/L (ref 133–144)
TSH SERPL DL<=0.005 MIU/L-ACNC: 1.37 MU/L (ref 0.4–4)
VIT B12 SERPL-MCNC: 378 PG/ML (ref 193–986)
WBC # BLD AUTO: 4.5 10E9/L (ref 4–11)

## 2019-06-14 PROCEDURE — 84443 ASSAY THYROID STIM HORMONE: CPT | Performed by: PHYSICIAN ASSISTANT

## 2019-06-14 PROCEDURE — 82746 ASSAY OF FOLIC ACID SERUM: CPT | Performed by: PHYSICIAN ASSISTANT

## 2019-06-14 PROCEDURE — 82306 VITAMIN D 25 HYDROXY: CPT | Performed by: PHYSICIAN ASSISTANT

## 2019-06-14 PROCEDURE — 36415 COLL VENOUS BLD VENIPUNCTURE: CPT | Performed by: PHYSICIAN ASSISTANT

## 2019-06-14 PROCEDURE — G0439 PPPS, SUBSEQ VISIT: HCPCS | Performed by: PHYSICIAN ASSISTANT

## 2019-06-14 PROCEDURE — 85027 COMPLETE CBC AUTOMATED: CPT | Performed by: PHYSICIAN ASSISTANT

## 2019-06-14 PROCEDURE — 82607 VITAMIN B-12: CPT | Performed by: PHYSICIAN ASSISTANT

## 2019-06-14 PROCEDURE — 80053 COMPREHEN METABOLIC PANEL: CPT | Performed by: PHYSICIAN ASSISTANT

## 2019-06-14 RX ORDER — BRIMONIDINE TARTRATE 2 MG/ML
SOLUTION/ DROPS OPHTHALMIC
Refills: 3 | COMMUNITY
Start: 2019-05-23

## 2019-06-14 RX ORDER — CYANOCOBALAMIN 1000 UG/ML
1 INJECTION, SOLUTION INTRAMUSCULAR; SUBCUTANEOUS
Qty: 12 ML | Refills: 0 | Status: SHIPPED | OUTPATIENT
Start: 2019-06-14 | End: 2020-05-18

## 2019-06-14 ASSESSMENT — ACTIVITIES OF DAILY LIVING (ADL): CURRENT_FUNCTION: NO ASSISTANCE NEEDED

## 2019-06-14 ASSESSMENT — PAIN SCALES - GENERAL: PAINLEVEL: NO PAIN (0)

## 2019-06-14 ASSESSMENT — MIFFLIN-ST. JEOR: SCORE: 1223.39

## 2019-06-14 NOTE — PROGRESS NOTES
Subjective     Barb Marie is a 79 year old female who presents to clinic today for the following health issues:    HPI   Medication Followup of  Cyanocobalamin (B-12); gives her own injections because has done well with this on her own for the past 20 years.   Continues on vitamin D supplement as well.       Taking Medication as prescribed: yes    Side Effects:  None    Medication Helping Symptoms:  yes       2) Diarrhea for the past 2 days. Was exposed to a GI illness from her friend's grand-daughter. Just one softer stool per day then what she is used to. No blood or mucous. No fevers. Still drinking well.  Fasting today since stomach is unsettled.    3) Followed by eye doctor annually for hx of bilateral glaucoma. Uses 2 eye drops for this.           Patient Active Problem List   Diagnosis     Mixed hyperlipidemia     HYPERLIPIDEMIA LDL GOAL <130     Advanced directives, counseling/discussion     Pernicious anemia--Vitamin B12 injections monthly     Glaucoma associated with underlying disease     Past Surgical History:   Procedure Laterality Date     CHOLECYSTECTOMY  2008    At [facility]     TONSILLECTOMY & ADENOIDECTOMY      As a child       Social History     Tobacco Use     Smoking status: Never Smoker     Smokeless tobacco: Never Used   Substance Use Topics     Alcohol use: No     Family History   Problem Relation Age of Onset     Heart Disease Father          Current Outpatient Medications   Medication Sig Dispense Refill     COSOPT 2-0.5 % OP SOLN instill 1 drop ou BID  0     cyanocobalamin (VITAMIN B12) 1000 MCG/ML injection Inject 1 mL (1,000 mcg) into the muscle every 30 days 12 mL 0     VITAMIN D, CHOLECALCIFEROL, PO Take 1,000 Units by mouth daily       brimonidine (ALPHAGAN) 0.2 % ophthalmic solution INSTILL 1 DROP INTO EACH EYE TWICE DAILY  3     Allergies   Allergen Reactions     Atorvastatin GI Disturbance     Pt is allergic to Lipitor.     Hmg-Coa-R Inhibitors      jaundice  "      Reviewed and updated as needed this visit by Provider         Review of Systems   {ROS COMP (Optional):720325}      Objective    There were no vitals taken for this visit.  There is no height or weight on file to calculate BMI.  Physical Exam   {Exam List (Optional):357775}    {Diagnostic Test Results (Optional):520433::\"Diagnostic Test Results:\",\"Labs reviewed in Epic\"}        {PROVIDER CHARTING PREFERENCE:857728}        "

## 2019-06-14 NOTE — PROGRESS NOTES
"SUBJECTIVE:   Barb Marie is a 79 year old female who presents for Preventive Visit.      Are you in the first 12 months of your Medicare coverage?  No    Healthy Habits:    In general, how would you rate your overall health?  Very good    Frequency of exercise:  6-7 days/week    Duration of exercise:  15-30 minutes    Do you usually eat at least 4 servings of fruit and vegetables a day, include whole grains    & fiber and avoid regularly eating high fat or \"junk\" foods?  No    Taking medications regularly:  No    Barriers to taking medications:  Not applicable    Medication side effects:  Not applicable    Ability to successfully perform activities of daily living:  No assistance needed    Home Safety:  No safety concerns identified    Hearing Impairment:  No hearing concerns    In the past 6 months, have you been bothered by leaking of urine?  No    In general, how would you rate your overall mental or emotional health?  Good      PHQ-2 Total Score:    Additional concerns today:  No    Do you feel safe in your environment? Yes    Do you have a Health Care Directive? No: Advance care planning was reviewed with patient; patient declined at this time.      Fall risk  Fallen 2 or more times in the past year?: No  Any fall with injury in the past year?: No    Cognitive Screening   1) Repeat 3 items (Leader, Season, Table)    2) Clock draw: NORMAL  3) 3 item recall: Recalls NO objects   Results: NORMAL clock, 1-2 items recalled: COGNITIVE IMPAIRMENT LESS LIKELY    Mini-CogTM Copyright ARIK Robertson. Licensed by the author for use in [facility]; reprinted with permission (balbina@.[email or web address]). All rights reserved.      Do you have sleep apnea, excessive snoring or daytime drowsiness?: yes    Reviewed and updated as needed this visit by clinical staff  Tobacco  Allergies  Meds  Med Hx  Surg Hx  Fam Hx  Soc Hx        Reviewed and updated as needed this visit by Provider  Tobacco  Allergies  Meds  Med Hx  " Surg Hx  Fam Hx  Soc Hx       Social History     Tobacco Use     Smoking status: Never Smoker     Smokeless tobacco: Never Used   Substance Use Topics     Alcohol use: No     If you drink alcohol do you typically have >3 drinks per day or >7 drinks per week? No    No flowsheet data found.    Medication Followup of  Cyanocobalamin (B-12); gives her own injections because has done well with this on her own for the past 20 years.   Continues on vitamin D supplement as well.       Taking Medication as prescribed: yes    Side Effects:  None    Medication Helping Symptoms:  yes       2) Diarrhea for the past 2 days. Was exposed to a GI illness from her friend's grand-daughter. Just one softer stool per day then what she is used to. No blood or mucous. No fevers. Still drinking well.  Fasting today since stomach is unsettled.    3) Followed by eye doctor annually for hx of bilateral glaucoma. Uses 2 eye drops for this.      Current providers sharing in care for this patient include:     Patient Care Team:  [facility] as PCP - Renetta Rangel PA-C as Assigned PCP    The following health maintenance items are reviewed in Epic and correct as of today:  Health Maintenance   Topic Date Due     MEDICARE ANNUAL WELLNESS VISIT  06/07/2006     PHQ-2  01/01/2019     ADVANCED DIRECTIVE PLANNING  05/14/2019     DTAP/TDAP/TD IMMUNIZATION (1 - Tdap) 06/04/2020 (Originally 3/14/1965)     PNEUMOCOCCAL IMMUNIZATION 65+ LOW/MEDIUM RISK (1 of 2 - PCV13) 06/14/2020 (Originally 3/14/2003)     ZOSTER IMMUNIZATION (1 of 2) 06/14/2020 (Originally 3/14/1988)     INFLUENZA VACCINE  Completed     DEXA  Addressed     IPV IMMUNIZATION  Aged Out     MENINGITIS IMMUNIZATION  Aged Out     Labs reviewed in EPIC  BP Readings from Last 3 Encounters:   06/14/19 110/72   06/12/18 136/72   06/06/17 128/72    Wt Readings from Last 3 Encounters:   06/14/19 72.6 kg (160 lb)   06/12/18 75.3 kg (166 lb)   06/06/17 77.1 kg (170  "lb)                  Patient Active Problem List   Diagnosis     Mixed hyperlipidemia     HYPERLIPIDEMIA LDL GOAL <130     Advanced directives, counseling/discussion     Pernicious anemia--Vitamin B12 injections monthly     Glaucoma associated with underlying disease     Past Surgical History:   Procedure Laterality Date     CHOLECYSTECTOMY  2008    At [facility]     TONSILLECTOMY & ADENOIDECTOMY      As a child       Social History     Tobacco Use     Smoking status: Never Smoker     Smokeless tobacco: Never Used   Substance Use Topics     Alcohol use: No     Family History   Problem Relation Age of Onset     Heart Disease Father          Current Outpatient Medications   Medication Sig Dispense Refill     COSOPT 2-0.5 % OP SOLN instill 1 drop ou BID  0     cyanocobalamin (CYANOCOBALAMIN) 1000 MCG/ML injection Inject 1 mL (1,000 mcg) into the muscle every 30 days 12 mL 0     VITAMIN D, CHOLECALCIFEROL, PO Take 1,000 Units by mouth daily       brimonidine (ALPHAGAN) 0.2 % ophthalmic solution INSTILL 1 DROP INTO EACH EYE TWICE DAILY  3     Allergies   Allergen Reactions     Atorvastatin GI Disturbance     Pt is allergic to Lipitor.     Hmg-Coa-R Inhibitors      jaundice     Pneumonia Vaccine: pt declines  Mammogram Screening: pt declines     Review of Systems  Constitutional, HEENT, cardiovascular, pulmonary, GI, , musculoskeletal, neuro, skin, endocrine and psych systems are negative, except as otherwise noted.    OBJECTIVE:   /72 (BP Location: Right arm, Patient Position: Sitting, Cuff Size: Adult Regular)   Pulse 94   Temp 99  F (37.2  C) (Tympanic)   Resp 14   Ht 1.702 m (5' 7\")   Wt 72.6 kg (160 lb)   SpO2 98%   BMI 25.06 kg/m   Estimated body mass index is 25.06 kg/m  as calculated from the following:    Height as of this encounter: 1.702 m (5' 7\").    Weight as of this encounter: 72.6 kg (160 lb).  Physical Exam  GENERAL: healthy, alert and no distress  EYES: Eyes grossly normal to " inspection, PERRL and conjunctivae and sclerae normal  NECK: no adenopathy, no asymmetry, masses, or scars and thyroid normal to palpation  RESP: lungs clear to auscultation - no rales, rhonchi or wheezes  CV: regular rates and rhythm and no murmur, click or rub  ABDOMEN: soft, nontender, no hepatosplenomegaly, no masses and bowel sounds normal  MS: no gross musculoskeletal defects noted, no edema  NEURO: Normal strength and tone, mentation intact and speech normal  PSYCH: mentation appears normal, affect normal/bright    Diagnostic Test Results:  Labs reviewed in Epic    ASSESSMENT / PLAN:       ICD-10-CM    1. Encounter for Medicare annual wellness exam Z00.00    2. Vitamin D deficiency E55.9 Vitamin D Deficiency   3. Mixed hyperlipidemia E78.2    4. Pernicious anemia--Vitamin B12 injections monthly D51.0 CBC with platelets     Vitamin B12     Comprehensive metabolic panel     TSH with free T4 reflex     Folate     cyanocobalamin (CYANOCOBALAMIN) 1000 MCG/ML injection   5. 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 Loose stools R19.5    Very stable hx of pernicious anemia - gives B12 injections at home for past 20 yrs.  Repeat labs for stability.  Declines lipid screening as would not want to be on any medication.  Also declines all other immunizations, preventative health recommendations.  Pt also mentioned an episode of looser/softer stool the past 2 days since exposed to her friends grand-daughter who had this as well. Suspect viral and pt will continue with observation, increased fluid intake. Encouraged to return with any worsening or persistent concerns.     End of Life Planning:  Patient currently has an advanced directive: No.  I have verified the patient's ablity to prepare an advanced directive/make health care decisions.     COUNSELING:  Reviewed preventive health counseling, as reflected in patient instructions       Regular exercise       Healthy diet/nutrition        "Immunizations    Declined: Pneumococcal, TDAP and Zoster due to Other pt does not feel she needs them               Osteoporosis Prevention/Bone Health    Estimated body mass index is 25.06 kg/m  as calculated from the following:    Height as of this encounter: 1.702 m (5' 7\").    Weight as of this encounter: 72.6 kg (160 lb).         reports that she has never smoked. She has never used smokeless tobacco.      Appropriate preventive services were discussed with this patient, including applicable screening as appropriate for cardiovascular disease, diabetes, osteopenia/osteoporosis, and glaucoma.  As appropriate for age/gender, discussed screening for colorectal cancer, prostate cancer, breast cancer, and cervical cancer. Checklist reviewing preventive services available has been given to the patient.    Reviewed patients plan of care and provided an AVS. The Basic Care Plan (routine screening as documented in Health Maintenance) for Barb meets the Care Plan requirement. This Care Plan has been established and reviewed with the Patient.    Counseling Resources:  ATP IV Guidelines  Pooled Cohorts Equation Calculator  Breast Cancer Risk Calculator  FRAX Risk Assessment  ICSI Preventive Guidelines  Dietary Guidelines for Americans, 2010  USDA's MyPlate  ASA Prophylaxis  Lung CA Screening    Renetta Jaramillo PA-C  [facility]    Identified Health Risks:  "

## 2019-06-14 NOTE — PATIENT INSTRUCTIONS
Patient Education   Personalized Prevention Plan  You are due for the preventive services outlined below.  Your care team is available to assist you in scheduling these services.  If you have already completed any of these items, please share that information with your care team to update in your medical record.  Health Maintenance Due   Topic Date Due     Annual Wellness Visit  06/07/2006     PHQ-2  01/01/2019     Discuss Advance Directive Planning  05/14/2019        Patient Education   Personalized Prevention Plan  You are due for the preventive services outlined below.  Your care team is available to assist you in scheduling these services.  If you have already completed any of these items, please share that information with your care team to update in your medical record.  Health Maintenance Due   Topic Date Due     Annual Wellness Visit  06/07/2006     PHQ-2  01/01/2019     Discuss Advance Directive Planning  05/14/2019

## 2019-06-16 LAB — DEPRECATED CALCIDIOL+CALCIFEROL SERPL-MC: 47 UG/L (ref 20–75)

## 2019-06-18 ENCOUNTER — TELEPHONE (OUTPATIENT)
Dept: FAMILY MEDICINE | Facility: CLINIC | Age: 79
End: 2019-06-18

## 2019-06-18 DIAGNOSIS — R73.9 ELEVATED BLOOD SUGAR: Primary | ICD-10-CM

## 2019-06-18 NOTE — TELEPHONE ENCOUNTER
Reason for Call:  Other call back    Detailed comments: Pt is wondering lab results done on 6/14    Phone Number Patient can be reached at: Home number on file [phone] (home)    Best Time: anytime    Can we leave a detailed message on this number? NO    Call taken on 6/18/2019 at 1:25 PM by Belinda Clay

## 2019-06-18 NOTE — TELEPHONE ENCOUNTER
Please see message from patient below. Please advise on results when able. Thank you.  RHONDA FrederickN, RN  [facility]

## 2019-06-19 NOTE — TELEPHONE ENCOUNTER
I gave Barb below information. She wants to call back for the lab only appointment next week, says she does not know her schedule. Licha Baca R.N.

## 2019-06-20 NOTE — RESULT ENCOUNTER NOTE
Result(s) was/were reviewed with pt by RN, see tele enc 6/18.  Electronically Signed By: Renetta Jaramillo PA-C

## 2019-06-26 DIAGNOSIS — R73.9 ELEVATED BLOOD SUGAR: ICD-10-CM

## 2019-06-26 LAB — HBA1C MFR BLD: 5.6 % (ref 0–5.6)

## 2019-06-26 PROCEDURE — 83036 HEMOGLOBIN GLYCOSYLATED A1C: CPT | Performed by: PHYSICIAN ASSISTANT

## 2019-06-26 PROCEDURE — 36415 COLL VENOUS BLD VENIPUNCTURE: CPT | Performed by: PHYSICIAN ASSISTANT

## 2019-06-26 NOTE — LETTER
July 1, 2019      Barb Barker Frank  [address] [place] [address]        Dear Ms.Frank,    We are writing to inform you of your test results.      -A1C (diabetic test) is normal and indicates that your blood sugar has been in a normal range the last 3 months.    For additional lab test information, [email or web address] is an excellent reference.  Please contact the clinic at [phone] with any further questions or concerns.    Resulted Orders   **A1C FUTURE 3mo   Result Value Ref Range    Hemoglobin A1C 5.6 0 - 5.6 %      Comment:      Normal <5.7% Prediabetes 5.7-6.4%  Diabetes 6.5% or higher - adopted from ADA   consensus guidelines.           Sincerely,        Renetta Jaramillo PA-C

## 2019-06-29 NOTE — RESULT ENCOUNTER NOTE
Please call or write patient with the following results:    Dear Barb,    Your recent lab results are noted below:    -A1C (diabetic test) is normal and indicates that your blood sugar has been in a normal range the last 3 months.    For additional lab test information, [email or web address] is an excellent reference.  Please contact the clinic at [phone] with any further questions or concerns.      Thank you,      Renetta Jaramillo PA-C  [facility]

## 2020-04-28 DIAGNOSIS — D51.0 PERNICIOUS ANEMIA: ICD-10-CM

## 2020-04-28 RX ORDER — CYANOCOBALAMIN 1000 UG/ML
INJECTION, SOLUTION INTRAMUSCULAR; SUBCUTANEOUS
Qty: 12 ML | Refills: 0 | OUTPATIENT
Start: 2020-04-28

## 2020-04-28 NOTE — TELEPHONE ENCOUNTER
Not due for refill until June 2020 at which time patient will be due for annual visit. Pharmacy notified.  RHONDA FrederickN, RN  [facility]

## 2020-05-13 DIAGNOSIS — D51.0 PERNICIOUS ANEMIA: ICD-10-CM

## 2020-05-13 RX ORDER — CYANOCOBALAMIN 1000 UG/ML
INJECTION, SOLUTION INTRAMUSCULAR; SUBCUTANEOUS
Qty: 12 ML | Refills: 0 | OUTPATIENT
Start: 2020-05-13

## 2020-05-13 NOTE — TELEPHONE ENCOUNTER
Refused. 12 month supply ordered 6/14/19. Patient is due for yearly follow up appointment. Please help schedule. Route back to triage if out of medication before can schedule. Thanks. Cleo Briceno RN

## 2020-11-24 ENCOUNTER — OFFICE VISIT (OUTPATIENT)
Dept: FAMILY MEDICINE | Facility: CLINIC | Age: 80
End: 2020-11-24
Payer: MEDICARE

## 2020-11-24 VITALS
DIASTOLIC BLOOD PRESSURE: 78 MMHG | HEIGHT: 67 IN | OXYGEN SATURATION: 95 % | WEIGHT: 173 LBS | TEMPERATURE: 98.1 F | BODY MASS INDEX: 27.15 KG/M2 | SYSTOLIC BLOOD PRESSURE: 128 MMHG | HEART RATE: 70 BPM

## 2020-11-24 DIAGNOSIS — I87.8 VENOUS STASIS: ICD-10-CM

## 2020-11-24 DIAGNOSIS — R60.0 BILATERAL LEG EDEMA: Primary | ICD-10-CM

## 2020-11-24 LAB
ANION GAP SERPL CALCULATED.3IONS-SCNC: 5 MMOL/L (ref 3–14)
BUN SERPL-MCNC: 17 MG/DL (ref 7–30)
CALCIUM SERPL-MCNC: 9.2 MG/DL (ref 8.5–10.1)
CHLORIDE SERPL-SCNC: 110 MMOL/L (ref 94–109)
CO2 SERPL-SCNC: 26 MMOL/L (ref 20–32)
CREAT SERPL-MCNC: 1.08 MG/DL (ref 0.52–1.04)
GFR SERPL CREATININE-BSD FRML MDRD: 48 ML/MIN/{1.73_M2}
GLUCOSE SERPL-MCNC: 113 MG/DL (ref 70–99)
POTASSIUM SERPL-SCNC: 3.8 MMOL/L (ref 3.4–5.3)
SODIUM SERPL-SCNC: 141 MMOL/L (ref 133–144)

## 2020-11-24 PROCEDURE — 99214 OFFICE O/P EST MOD 30 MIN: CPT | Performed by: FAMILY MEDICINE

## 2020-11-24 PROCEDURE — 80048 BASIC METABOLIC PNL TOTAL CA: CPT | Performed by: FAMILY MEDICINE

## 2020-11-24 PROCEDURE — 36415 COLL VENOUS BLD VENIPUNCTURE: CPT | Performed by: FAMILY MEDICINE

## 2020-11-24 ASSESSMENT — ENCOUNTER SYMPTOMS
NAUSEA: 0
COUGH: 0
FATIGUE: 0
BACK PAIN: 0
SHORTNESS OF BREATH: 0
COLOR CHANGE: 0
FEVER: 0
HEADACHES: 0

## 2020-11-24 ASSESSMENT — MIFFLIN-ST. JEOR: SCORE: 1277.35

## 2020-11-24 NOTE — LETTER
[facility]                    December 2, 2020    Barb Marie  [address] [place] [address]      Dear Barb,    Here is a summary of your recent test results:     normal electrolyte panel .   kidney function mildly elevated .   Recommended to continue with adequate fluid intake .   I would recommend rechecking kidney function in 3 months .     Your test results are enclosed.      Please contact me if you have any questions.             Thank you very much for trusting [facility].     Healthy regards,    Dr. Cait Paulino MD          Results for orders placed or performed in visit on 11/24/20   Basic metabolic panel  (Ca, Cl, CO2, Creat, Gluc, K, Na, BUN)     Status: Abnormal   Result Value Ref Range    Sodium 141 133 - 144 mmol/L    Potassium 3.8 3.4 - 5.3 mmol/L    Chloride 110 (H) 94 - 109 mmol/L    Carbon Dioxide 26 20 - 32 mmol/L    Anion Gap 5 3 - 14 mmol/L    Glucose 113 (H) 70 - 99 mg/dL    Urea Nitrogen 17 7 - 30 mg/dL    Creatinine 1.08 (H) 0.52 - 1.04 mg/dL    GFR Estimate 48 (L) >60 mL/min/[1.73_m2]    GFR Estimate If Black 55 (L) >60 mL/min/[1.73_m2]    Calcium 9.2 8.5 - 10.1 mg/dL

## 2020-11-24 NOTE — PATIENT INSTRUCTIONS
Reduce salt in the  Diet .  Keep legs elevated .  Use below knee compression socks .    Contact if not better

## 2020-12-02 DIAGNOSIS — R79.89 ELEVATED SERUM CREATININE: Primary | ICD-10-CM

## 2021-01-25 ENCOUNTER — TELEPHONE (OUTPATIENT)
Dept: FAMILY MEDICINE | Facility: CLINIC | Age: 81
End: 2021-01-25

## 2021-01-25 NOTE — TELEPHONE ENCOUNTER
Patient Quality Outreach      Summary:    Patient has the following on her problem list/HM: None    Patient is due/failing the following:   Annual wellness, date due: 6/14/2020    Type of outreach:    Phone, spoke to patient/parent. scheduled patient for 4/1/2021    Questions for provider review:    None                                                                                                                             Virginie Odonnell CMA

## 2021-03-22 ENCOUNTER — IMMUNIZATION (OUTPATIENT)
Dept: NURSING | Facility: CLINIC | Age: 81
End: 2021-03-22
Payer: MEDICARE

## 2021-03-22 PROCEDURE — 91300 PR COVID VAC PFIZER DIL RECON 30 MCG/0.3 ML IM: CPT

## 2021-03-22 PROCEDURE — 0001A PR COVID VAC PFIZER DIL RECON 30 MCG/0.3 ML IM: CPT

## 2021-04-12 ENCOUNTER — IMMUNIZATION (OUTPATIENT)
Dept: NURSING | Facility: CLINIC | Age: 81
End: 2021-04-12
Attending: INTERNAL MEDICINE
Payer: MEDICARE

## 2021-04-12 PROCEDURE — 0002A PR COVID VAC PFIZER DIL RECON 30 MCG/0.3 ML IM: CPT

## 2021-04-12 PROCEDURE — 91300 PR COVID VAC PFIZER DIL RECON 30 MCG/0.3 ML IM: CPT

## 2022-03-07 ENCOUNTER — OFFICE VISIT (OUTPATIENT)
Dept: FAMILY MEDICINE | Facility: CLINIC | Age: 82
End: 2022-03-07
Payer: MEDICARE

## 2022-03-07 VITALS
SYSTOLIC BLOOD PRESSURE: 118 MMHG | WEIGHT: 167 LBS | TEMPERATURE: 97 F | HEART RATE: 65 BPM | OXYGEN SATURATION: 98 % | HEIGHT: 67 IN | BODY MASS INDEX: 26.21 KG/M2 | DIASTOLIC BLOOD PRESSURE: 62 MMHG

## 2022-03-07 DIAGNOSIS — N18.31 CHRONIC KIDNEY DISEASE, STAGE 3A (H): ICD-10-CM

## 2022-03-07 DIAGNOSIS — R53.83 OTHER FATIGUE: ICD-10-CM

## 2022-03-07 DIAGNOSIS — R73.09 ELEVATED HEMOGLOBIN A1C: ICD-10-CM

## 2022-03-07 DIAGNOSIS — D51.0 PERNICIOUS ANEMIA: ICD-10-CM

## 2022-03-07 DIAGNOSIS — N30.00 ACUTE CYSTITIS WITHOUT HEMATURIA: ICD-10-CM

## 2022-03-07 DIAGNOSIS — R94.31 NONSPECIFIC ABNORMAL ELECTROCARDIOGRAM (ECG) (EKG): ICD-10-CM

## 2022-03-07 DIAGNOSIS — M62.81 GENERALIZED MUSCLE WEAKNESS: Primary | ICD-10-CM

## 2022-03-07 DIAGNOSIS — R79.89 ELEVATED SERUM CREATININE: ICD-10-CM

## 2022-03-07 DIAGNOSIS — E55.9 VITAMIN D DEFICIENCY: ICD-10-CM

## 2022-03-07 LAB
ALBUMIN SERPL-MCNC: 3.4 G/DL (ref 3.4–5)
ALBUMIN UR-MCNC: NEGATIVE MG/DL
ALP SERPL-CCNC: 84 U/L (ref 40–150)
ALT SERPL W P-5'-P-CCNC: 23 U/L (ref 0–50)
ANION GAP SERPL CALCULATED.3IONS-SCNC: 7 MMOL/L (ref 3–14)
APPEARANCE UR: CLEAR
AST SERPL W P-5'-P-CCNC: 17 U/L (ref 0–45)
BACTERIA #/AREA URNS HPF: ABNORMAL /HPF
BASOPHILS # BLD AUTO: 0 10E3/UL (ref 0–0.2)
BASOPHILS NFR BLD AUTO: 1 %
BILIRUB SERPL-MCNC: 0.9 MG/DL (ref 0.2–1.3)
BILIRUB UR QL STRIP: NEGATIVE
BUN SERPL-MCNC: 16 MG/DL (ref 7–30)
CALCIUM SERPL-MCNC: 9.3 MG/DL (ref 8.5–10.1)
CHLORIDE BLD-SCNC: 108 MMOL/L (ref 94–109)
CO2 SERPL-SCNC: 25 MMOL/L (ref 20–32)
COLOR UR AUTO: ABNORMAL
CREAT SERPL-MCNC: 0.91 MG/DL (ref 0.52–1.04)
DEPRECATED CALCIDIOL+CALCIFEROL SERPL-MC: 30 UG/L (ref 20–75)
EOSINOPHIL # BLD AUTO: 0.1 10E3/UL (ref 0–0.7)
EOSINOPHIL NFR BLD AUTO: 1 %
ERYTHROCYTE [DISTWIDTH] IN BLOOD BY AUTOMATED COUNT: 12.4 % (ref 10–15)
GFR SERPL CREATININE-BSD FRML MDRD: 62 ML/MIN/1.73M2
GLUCOSE BLD-MCNC: 120 MG/DL (ref 70–99)
GLUCOSE UR STRIP-MCNC: NEGATIVE MG/DL
HBA1C MFR BLD: 5.8 % (ref 0–5.6)
HCT VFR BLD AUTO: 43.7 % (ref 35–47)
HGB BLD-MCNC: 15.3 G/DL (ref 11.7–15.7)
HGB UR QL STRIP: ABNORMAL
IMM GRANULOCYTES # BLD: 0 10E3/UL
IMM GRANULOCYTES NFR BLD: 0 %
KETONES UR STRIP-MCNC: NEGATIVE MG/DL
LEUKOCYTE ESTERASE UR QL STRIP: ABNORMAL
LYMPHOCYTES # BLD AUTO: 0.8 10E3/UL (ref 0.8–5.3)
LYMPHOCYTES NFR BLD AUTO: 12 %
MCH RBC QN AUTO: 33.1 PG (ref 26.5–33)
MCHC RBC AUTO-ENTMCNC: 35 G/DL (ref 31.5–36.5)
MCV RBC AUTO: 95 FL (ref 78–100)
MONOCYTES # BLD AUTO: 0.4 10E3/UL (ref 0–1.3)
MONOCYTES NFR BLD AUTO: 6 %
NEUTROPHILS # BLD AUTO: 5.1 10E3/UL (ref 1.6–8.3)
NEUTROPHILS NFR BLD AUTO: 80 %
NITRATE UR QL: POSITIVE
PH UR STRIP: 5 [PH] (ref 5–7)
PLATELET # BLD AUTO: 208 10E3/UL (ref 150–450)
POTASSIUM BLD-SCNC: 3.9 MMOL/L (ref 3.4–5.3)
PROT SERPL-MCNC: 7.2 G/DL (ref 6.8–8.8)
RBC # BLD AUTO: 4.62 10E6/UL (ref 3.8–5.2)
RBC #/AREA URNS AUTO: ABNORMAL /HPF
SODIUM SERPL-SCNC: 140 MMOL/L (ref 133–144)
SP GR UR STRIP: >=1.03 (ref 1–1.03)
SQUAMOUS #/AREA URNS AUTO: ABNORMAL /LPF
TSH SERPL DL<=0.005 MIU/L-ACNC: 3.62 MU/L (ref 0.4–4)
UROBILINOGEN UR STRIP-ACNC: 0.2 E.U./DL
VIT B12 SERPL-MCNC: 228 PG/ML (ref 193–986)
WBC # BLD AUTO: 6.4 10E3/UL (ref 4–11)
WBC #/AREA URNS AUTO: ABNORMAL /HPF

## 2022-03-07 PROCEDURE — 82607 VITAMIN B-12: CPT | Performed by: NURSE PRACTITIONER

## 2022-03-07 PROCEDURE — 84443 ASSAY THYROID STIM HORMONE: CPT | Performed by: NURSE PRACTITIONER

## 2022-03-07 PROCEDURE — 83036 HEMOGLOBIN GLYCOSYLATED A1C: CPT | Performed by: NURSE PRACTITIONER

## 2022-03-07 PROCEDURE — 81001 URINALYSIS AUTO W/SCOPE: CPT | Performed by: NURSE PRACTITIONER

## 2022-03-07 PROCEDURE — 93000 ELECTROCARDIOGRAM COMPLETE: CPT | Performed by: NURSE PRACTITIONER

## 2022-03-07 PROCEDURE — 36415 COLL VENOUS BLD VENIPUNCTURE: CPT | Performed by: NURSE PRACTITIONER

## 2022-03-07 PROCEDURE — 99214 OFFICE O/P EST MOD 30 MIN: CPT | Mod: 25 | Performed by: NURSE PRACTITIONER

## 2022-03-07 PROCEDURE — 82306 VITAMIN D 25 HYDROXY: CPT | Performed by: NURSE PRACTITIONER

## 2022-03-07 PROCEDURE — 96372 THER/PROPH/DIAG INJ SC/IM: CPT | Performed by: NURSE PRACTITIONER

## 2022-03-07 PROCEDURE — 87186 SC STD MICRODIL/AGAR DIL: CPT | Performed by: NURSE PRACTITIONER

## 2022-03-07 PROCEDURE — 85025 COMPLETE CBC W/AUTO DIFF WBC: CPT | Performed by: NURSE PRACTITIONER

## 2022-03-07 PROCEDURE — 80053 COMPREHEN METABOLIC PANEL: CPT | Performed by: NURSE PRACTITIONER

## 2022-03-07 PROCEDURE — 87086 URINE CULTURE/COLONY COUNT: CPT | Performed by: NURSE PRACTITIONER

## 2022-03-07 RX ORDER — CYANOCOBALAMIN 1000 UG/ML
1000 INJECTION, SOLUTION INTRAMUSCULAR; SUBCUTANEOUS
Status: ACTIVE | OUTPATIENT
Start: 2022-03-07 | End: 2023-03-02

## 2022-03-07 RX ADMIN — CYANOCOBALAMIN 1000 MCG: 1000 INJECTION, SOLUTION INTRAMUSCULAR; SUBCUTANEOUS at 08:20

## 2022-03-07 ASSESSMENT — PATIENT HEALTH QUESTIONNAIRE - PHQ9
SUM OF ALL RESPONSES TO PHQ QUESTIONS 1-9: 5
SUM OF ALL RESPONSES TO PHQ QUESTIONS 1-9: 5

## 2022-03-07 ASSESSMENT — ENCOUNTER SYMPTOMS: FATIGUE: 1

## 2022-03-07 NOTE — PROGRESS NOTES
"Assessment & Plan     Generalized muscle weakness  Other fatigue  Long standing but worse recently.   Reassuring exam no higher level of care felt to be needed.   Labs.   EKG abnormal no acute symptoms; ECHO and Stress test.   B12 injection today and restart monthly.   Neurology is needed  if persisting or worsening.   Close follow up with physical in clinic within 4 weeks.  If she develops ANY chest pain, SOB, near syncope/syncope, or fall she should present to the emergency room or call 911.  Barb verbalizes understanding of plan of care and is in agreement.   - CBC with platelets and differential  - TSH with free T4 reflex  - EKG 12-lead complete w/read - Clinics  - UA Macro with Reflex to Micro and Culture - lab collect  - CBC with platelets and differential  - TSH with free T4 reflex  - Urine Microscopic Exam  - Urine Culture    Pernicious anemia--Vitamin B12 injections monthly  Pernicious anemia    - Vitamin B12  - cyanocobalamin injection 1,000 mcg  - Vitamin B12  - cyanocobalamin (CYANOCOBALAMIN) 1000 MCG/ML injection  Dispense: 3 mL; Refill: 3  - syringe 22G X 1-1/2\" 3 ML MISC  Dispense: 12 each; Refill: 1    Chronic kidney disease, stage 3a (H)      Nonspecific abnormal electrocardiogram (ECG) (EKG)    - Echocardiogram Complete  - NM Lexiscan stress test    Acute cystitis without hematuria    - cefdinir (OMNICEF) 300 MG capsule  Dispense: 14 capsule; Refill: 0  - Urine Culture Aerobic Bacterial - lab collect    Elevated hemoglobin A1c    - Hemoglobin A1c  - Hemoglobin A1c    Elevated serum creatinine    - Comprehensive metabolic panel (BMP + Alb, Alk Phos, ALT, AST, Total. Bili, TP)  - Comprehensive metabolic panel (BMP + Alb, Alk Phos, ALT, AST, Total. Bili, TP)    Vitamin D deficiency    - Vitamin D Deficiency  - Vitamin D Deficiency    BMI:   Estimated body mass index is 26.16 kg/m  as calculated from the following:    Height as of this encounter: 1.702 m (5' 7\").    Weight as of this encounter: 75.8 " "kg (167 lb).       Return in about 4 weeks (around 4/4/2022) for Wellness exam fasting labs.      JENNIFER Worthington CNP  [facility] PRIOR ROMAN Day is a 81 year old who presents for the following health issues  accompanied by her Friend.zhane Farias.    Fatigue  Associated symptoms include fatigue.   History of Present Illness     Reason for visit:  Weak legs  Symptom onset:  More than a month  Symptoms include:  Very weak  Symptom progression:  Worsening  Had these symptoms before:  Yes  Has tried/received treatment for these symptoms:  Yes  Previous treatment was successful:  No  What makes it worse:  No  What makes it better:  No    She eats 2-3 servings of fruits and vegetables daily.She consumes 1 sweetened beverage(s) daily.She exercises with enough effort to increase her heart rate 9 or less minutes per day.  She exercises with enough effort to increase her heart rate 3 or less days per week.   She is taking medications regularly.     Fatigued. Weakness for months maybe years mostly in her legs. Also feels she has lost \"her get up and go\"; denies any chest pain or shortness of breath.  Very recently all symptoms worse.   Has close neighbor friend who checks in on her.   Known vitamin B12 deficiency usually giving her self injections at home.  Has not done this in quite some time.  Denies any significant leg edema.  Denies urinary symptoms. No abnormal pain.   Denies previous cardiac or lung history never smoker.  Denies family history of neurologic disorder such as Parkinson's. Denies vision changes.   Stable weights.     Wt Readings from Last 5 Encounters:   03/07/22 75.8 kg (167 lb)   11/24/20 78.5 kg (173 lb)   06/14/19 72.6 kg (160 lb)   06/12/18 75.3 kg (166 lb)   06/06/17 77.1 kg (170 lb)     Review of Systems   Constitutional: Positive for fatigue.          Objective    /62 (BP Location: Right arm, Patient Position: Chair, Cuff Size: Adult Large)   Pulse 65 " "  Temp 97  F (36.1  C) (Tympanic)   Ht 1.702 m (5' 7\")   Wt 75.8 kg (167 lb)   SpO2 98%   Breastfeeding No   BMI 26.16 kg/m    Body mass index is 26.16 kg/m .  Physical Exam   GENERAL: healthy, alert and no distress; gait somewhat shuffled.   EYES: Eyes grossly normal to inspection, PERRL and conjunctivae and sclerae normal  HENT: ear canals and TM's normal, nose and mouth without ulcers or lesions  NECK: no adenopathy, no asymmetry, masses, or scars and thyroid normal to palpation  RESP: lungs clear to auscultation - no rales, rhonchi or wheezes  CV: regular rate and rhythm, normal S1 S2, no S3 or S4, no murmur, click or rub, no peripheral edema and peripheral pulses strong  ABDOMEN: soft, nontender, no hepatosplenomegaly, no masses and bowel sounds normal  MS: no gross musculoskeletal defects noted, no edema  SKIN: no suspicious lesions or rashes  NEURO: Normal strength and tone, mentation intact and speech normal  PSYCH: mentation appears normal, affect somewhat flat    Sinus  Bradycardia   Low voltage in limb leads.   T-abnormality  - Anterior/lateral ischemia.   ABNORMAL   Similar to 2010 EKG more pronounced inverted T waves V3-V6.        Results for orders placed or performed in visit on 03/07/22   UA Macro with Reflex to Micro and Culture - lab collect     Status: Abnormal    Specimen: Urine, Midstream   Result Value Ref Range    Color Urine Allegra (A) Colorless, Straw, Light Yellow, Yellow    Appearance Urine Clear Clear    Glucose Urine Negative Negative mg/dL    Bilirubin Urine Negative Negative    Ketones Urine Negative Negative mg/dL    Specific Gravity Urine >=1.030 1.003 - 1.035    Blood Urine Moderate (A) Negative    pH Urine 5.0 5.0 - 7.0    Protein Albumin Urine Negative Negative mg/dL    Urobilinogen Urine 0.2 0.2, 1.0 E.U./dL    Nitrite Urine Positive (A) Negative    Leukocyte Esterase Urine Small (A) Negative   Comprehensive metabolic panel (BMP + Alb, Alk Phos, ALT, AST, Total. Bili, TP)    "  Status: Abnormal   Result Value Ref Range    Sodium 140 133 - 144 mmol/L    Potassium 3.9 3.4 - 5.3 mmol/L    Chloride 108 94 - 109 mmol/L    Carbon Dioxide (CO2) 25 20 - 32 mmol/L    Anion Gap 7 3 - 14 mmol/L    Urea Nitrogen 16 7 - 30 mg/dL    Creatinine 0.91 0.52 - 1.04 mg/dL    Calcium 9.3 8.5 - 10.1 mg/dL    Glucose 120 (H) 70 - 99 mg/dL    Alkaline Phosphatase 84 40 - 150 U/L    AST 17 0 - 45 U/L    ALT 23 0 - 50 U/L    Protein Total 7.2 6.8 - 8.8 g/dL    Albumin 3.4 3.4 - 5.0 g/dL    Bilirubin Total 0.9 0.2 - 1.3 mg/dL    GFR Estimate 62 >60 mL/min/1.73m2   Hemoglobin A1c     Status: Abnormal   Result Value Ref Range    Hemoglobin A1C 5.8 (H) 0.0 - 5.6 %   TSH with free T4 reflex     Status: Normal   Result Value Ref Range    TSH 3.62 0.40 - 4.00 mU/L   Vitamin B12     Status: Normal   Result Value Ref Range    Vitamin B12 228 193 - 986 pg/mL   Vitamin D Deficiency     Status: Normal   Result Value Ref Range    Vitamin D, Total (25-Hydroxy) 30 20 - 75 ug/L    Narrative    Season, race, dietary intake, and treatment affect the concentration of 25-hydroxy-Vitamin D. Values may decrease during winter months and increase during summer months. Values 20-29 ug/L may indicate Vitamin D insufficiency and values <20 ug/L may indicate Vitamin D deficiency.    Vitamin D determination is routinely performed by an immunoassay specific for 25 hydroxyvitamin D3.  If an individual is on vitamin D2(ergocalciferol) supplementation, please specify 25 OH vitamin D2 and D3 level determination by LCMSMS test VITD23.     CBC with platelets and differential     Status: Abnormal   Result Value Ref Range    WBC Count 6.4 4.0 - 11.0 10e3/uL    RBC Count 4.62 3.80 - 5.20 10e6/uL    Hemoglobin 15.3 11.7 - 15.7 g/dL    Hematocrit 43.7 35.0 - 47.0 %    MCV 95 78 - 100 fL    MCH 33.1 (H) 26.5 - 33.0 pg    MCHC 35.0 31.5 - 36.5 g/dL    RDW 12.4 10.0 - 15.0 %    Platelet Count 208 150 - 450 10e3/uL    % Neutrophils 80 %    % Lymphocytes 12 %     % Monocytes 6 %    % Eosinophils 1 %    % Basophils 1 %    % Immature Granulocytes 0 %    Absolute Neutrophils 5.1 1.6 - 8.3 10e3/uL    Absolute Lymphocytes 0.8 0.8 - 5.3 10e3/uL    Absolute Monocytes 0.4 0.0 - 1.3 10e3/uL    Absolute Eosinophils 0.1 0.0 - 0.7 10e3/uL    Absolute Basophils 0.0 0.0 - 0.2 10e3/uL    Absolute Immature Granulocytes 0.0 <=0.4 10e3/uL   Urine Microscopic Exam     Status: Abnormal   Result Value Ref Range    Bacteria Urine Many (A) None Seen /HPF    RBC Urine 0-2 0-2 /HPF /HPF    WBC Urine 5-10 (A) 0-5 /HPF /HPF    Squamous Epithelials Urine Few (A) None Seen /LPF   Urine Culture     Status: Abnormal    Specimen: Urine, Midstream   Result Value Ref Range    Culture 50,000-100,000 CFU/mL Escherichia coli (A)        Susceptibility    Escherichia coli - MIKE     Ampicillin <=2.0 Susceptible ug/mL     Ampicillin/ Sulbactam <=2.0 Susceptible ug/mL     Piperacillin/Tazobactam <=4.0 Susceptible ug/mL     Cefazolin* <=4.0 Susceptible ug/mL      * Cefazolin MIKE breakpoints are for the treatment of uncomplicated urinary tract infections. For the treatment of systemic infections, please contact the laboratory for additional testing.     Cefoxitin <=4.0 Susceptible ug/mL     Ceftazidime <=1.0 Susceptible ug/mL     Ceftriaxone <=1.0 Susceptible ug/mL     Cefepime <=1.0 Susceptible ug/mL     Gentamicin <=1.0 Susceptible ug/mL     Tobramycin <=1.0 Susceptible ug/mL     Ciprofloxacin <=0.25 Susceptible ug/mL     Levofloxacin <=0.12 Susceptible ug/mL     Nitrofurantoin <=16.0 Susceptible ug/mL     Trimethoprim/Sulfamethoxazole <=1/19 Susceptible ug/mL   CBC with platelets and differential     Status: Abnormal    Narrative    The following orders were created for panel order CBC with platelets and differential.  Procedure                               Abnormality         Status                     ---------                               -----------         ------                     CBC with platelets and  d...[[id number]]  Abnormal            Final result                 Please view results for these tests on the individual orders.           Answers for HPI/ROS submitted by the patient on 3/7/2022  PHQ9 TOTAL SCORE: 5

## 2022-03-07 NOTE — PATIENT INSTRUCTIONS
Patient Education     Weakness with Uncertain Cause  Based on your exam today, the exact cause of your weakness is not clear. But your weakness does not seem to be a sign of a serious illness at this time. Keep an eye on your symptoms and get medical advice as instructed below.   Home care    Rest at home today. Don't over-exert yourself.    Take any medicine as prescribed.    For the next few days, drink extra fluids (unless your healthcare provider wants you to restrict fluids for other reasons). Don't skip meals.    Unless otherwise directed, continue to take any prescription medicines.    Contact your healthcare provider if you have any questions or concerns.    Follow-up care  Follow up with your healthcare provider, or as advised.  When to seek medical advice  Call your healthcare provider right away for any of the following:    Symptoms get worse    Symptoms don't start getting better within 2 days    Fever of 100.4  F (38  C) or higher, or as directed by your healthcare provider  Call 911  Call 911 for any of these:     Chest, arm, neck, jaw, or upper back pain    Trouble breathing    Numbness or weakness of the face, one arm, or one leg    Slurred speech, confusion, or trouble speaking, walking, or seeing    Blood in vomit or stool (black or red color)    Severe headache    Loss of consciousness  [employer] last reviewed this educational content on 7/1/2020 2000-2021 [employer]. All rights reserved. This information is not intended as a substitute for professional medical care. Always follow your healthcare professional's instructions.

## 2022-03-07 NOTE — RESULT ENCOUNTER NOTE
Results discussed directly with patient while patient was present. Any further details documented in the note.   JENNIFER Worthington CNP

## 2022-03-08 ASSESSMENT — PATIENT HEALTH QUESTIONNAIRE - PHQ9: SUM OF ALL RESPONSES TO PHQ QUESTIONS 1-9: 5

## 2022-03-10 LAB — BACTERIA UR CULT: ABNORMAL

## 2022-03-10 RX ORDER — CEFDINIR 300 MG/1
300 CAPSULE ORAL 2 TIMES DAILY
Qty: 14 CAPSULE | Refills: 0 | Status: SHIPPED | OUTPATIENT
Start: 2022-03-10 | End: 2022-03-17

## 2022-03-10 RX ORDER — CYANOCOBALAMIN 1000 UG/ML
1000 INJECTION, SOLUTION INTRAMUSCULAR; SUBCUTANEOUS
Qty: 3 ML | Refills: 3 | Status: SHIPPED | OUTPATIENT
Start: 2022-03-10 | End: 2023-11-16

## 2022-03-10 NOTE — RESULT ENCOUNTER NOTE
Note to Staff: please call the patient to explain results.    Overall labs look good.   B12 wasn't significantly low. We are okay to just restart her monthly injection I have refilled this for her to do at home per her request.   She does have a UTI and this can certainly make her feel weakness. Cefdinir twice daily for 7 days sent for her UTI.   Her blood sugar and A1c are also at prediabetic range. Encourage minimizing high carbohydrate and processed foods if able. We will recheck this at her physical.    Antibiotics and B12 sent to pharmacy -lets have her on lab schedule in 2 weeks for recheck of urine.       Reba Martino, FNP-BC

## 2022-03-11 ENCOUNTER — TELEPHONE (OUTPATIENT)
Dept: FAMILY MEDICINE | Facility: CLINIC | Age: 82
End: 2022-03-11
Payer: MEDICARE

## 2022-03-11 DIAGNOSIS — D51.0 PERNICIOUS ANEMIA: Primary | ICD-10-CM

## 2022-03-11 RX ORDER — NEEDLES, SAFETY 22GX1 1/2"
1 NEEDLE, DISPOSABLE MISCELLANEOUS
Qty: 12 EACH | Refills: 1 | Status: SHIPPED | OUTPATIENT
Start: 2022-03-11 | End: 2023-11-16

## 2022-03-11 RX ORDER — SYRINGE W-NEEDLE,DISPOSAB,3 ML 25GX5/8"
1 SYRINGE, EMPTY DISPOSABLE MISCELLANEOUS
Qty: 12 EACH | Refills: 1 | Status: SHIPPED | OUTPATIENT
Start: 2022-03-11

## 2022-03-11 NOTE — TELEPHONE ENCOUNTER
"[facility] pharmacy calling.  Received prescription for syringes for Vit B12 injections.  States they normally dispense the 25 gauge 5/8\" needle 3ml syringes.    Prescription pended.    Please e-scribe if appropriate.  "

## 2022-05-09 ENCOUNTER — OFFICE VISIT (OUTPATIENT)
Dept: FAMILY MEDICINE | Facility: CLINIC | Age: 82
End: 2022-05-09
Payer: MEDICARE

## 2022-05-09 ENCOUNTER — PATIENT OUTREACH (OUTPATIENT)
Dept: OTHER | Facility: CLINIC | Age: 82
End: 2022-05-09

## 2022-05-09 VITALS
BODY MASS INDEX: 27.48 KG/M2 | WEIGHT: 171 LBS | DIASTOLIC BLOOD PRESSURE: 74 MMHG | TEMPERATURE: 97.7 F | OXYGEN SATURATION: 94 % | HEART RATE: 74 BPM | SYSTOLIC BLOOD PRESSURE: 128 MMHG | HEIGHT: 66 IN

## 2022-05-09 DIAGNOSIS — M62.81 GENERALIZED MUSCLE WEAKNESS: ICD-10-CM

## 2022-05-09 DIAGNOSIS — R94.31 NONSPECIFIC ABNORMAL ELECTROCARDIOGRAM (ECG) (EKG): ICD-10-CM

## 2022-05-09 DIAGNOSIS — N30.00 ACUTE CYSTITIS WITHOUT HEMATURIA: ICD-10-CM

## 2022-05-09 DIAGNOSIS — R60.0 BILATERAL LEG EDEMA: ICD-10-CM

## 2022-05-09 DIAGNOSIS — R29.898 WEAKNESS OF BOTH LOWER EXTREMITIES: ICD-10-CM

## 2022-05-09 DIAGNOSIS — E78.2 MIXED HYPERLIPIDEMIA: ICD-10-CM

## 2022-05-09 DIAGNOSIS — Z00.00 ENCOUNTER FOR MEDICARE ANNUAL WELLNESS EXAM: Primary | ICD-10-CM

## 2022-05-09 DIAGNOSIS — R53.1 LEFT-SIDED WEAKNESS: ICD-10-CM

## 2022-05-09 DIAGNOSIS — N64.59 ABNORMAL BREAST EXAM: ICD-10-CM

## 2022-05-09 DIAGNOSIS — Z23 NEED FOR COVID-19 VACCINE: ICD-10-CM

## 2022-05-09 DIAGNOSIS — R06.9 UNSPECIFIED ABNORMALITIES OF BREATHING: ICD-10-CM

## 2022-05-09 DIAGNOSIS — R41.3 MEMORY LOSS: ICD-10-CM

## 2022-05-09 DIAGNOSIS — D51.0 PERNICIOUS ANEMIA: ICD-10-CM

## 2022-05-09 DIAGNOSIS — R93.1 ABNORMAL ECHOCARDIOGRAM: ICD-10-CM

## 2022-05-09 DIAGNOSIS — N18.31 CHRONIC KIDNEY DISEASE, STAGE 3A (H): ICD-10-CM

## 2022-05-09 LAB
CHOLEST SERPL-MCNC: 194 MG/DL
CK SERPL-CCNC: 60 U/L (ref 30–225)
CREAT UR-MCNC: 101 MG/DL
FASTING STATUS PATIENT QL REPORTED: YES
HDLC SERPL-MCNC: 49 MG/DL
LDLC SERPL CALC-MCNC: 121 MG/DL
MICROALBUMIN UR-MCNC: 22 MG/L
MICROALBUMIN/CREAT UR: 21.78 MG/G CR (ref 0–25)
NONHDLC SERPL-MCNC: 145 MG/DL
NT-PROBNP SERPL-MCNC: 101 PG/ML (ref 0–1800)
TRIGL SERPL-MCNC: 118 MG/DL
VIT B12 SERPL-MCNC: 198 PG/ML (ref 193–986)

## 2022-05-09 PROCEDURE — 82043 UR ALBUMIN QUANTITATIVE: CPT | Performed by: NURSE PRACTITIONER

## 2022-05-09 PROCEDURE — 36415 COLL VENOUS BLD VENIPUNCTURE: CPT | Performed by: NURSE PRACTITIONER

## 2022-05-09 PROCEDURE — 99214 OFFICE O/P EST MOD 30 MIN: CPT | Mod: 25 | Performed by: NURSE PRACTITIONER

## 2022-05-09 PROCEDURE — 83880 ASSAY OF NATRIURETIC PEPTIDE: CPT | Performed by: NURSE PRACTITIONER

## 2022-05-09 PROCEDURE — 93000 ELECTROCARDIOGRAM COMPLETE: CPT | Performed by: NURSE PRACTITIONER

## 2022-05-09 PROCEDURE — G0439 PPPS, SUBSEQ VISIT: HCPCS | Performed by: NURSE PRACTITIONER

## 2022-05-09 PROCEDURE — 82607 VITAMIN B-12: CPT | Performed by: NURSE PRACTITIONER

## 2022-05-09 PROCEDURE — 80061 LIPID PANEL: CPT | Performed by: NURSE PRACTITIONER

## 2022-05-09 PROCEDURE — 82550 ASSAY OF CK (CPK): CPT | Performed by: NURSE PRACTITIONER

## 2022-05-09 PROCEDURE — 0054A COVID-19,PF,PFIZER (12+ YRS): CPT | Performed by: NURSE PRACTITIONER

## 2022-05-09 PROCEDURE — 91305 COVID-19,PF,PFIZER (12+ YRS): CPT | Performed by: NURSE PRACTITIONER

## 2022-05-09 PROCEDURE — 87086 URINE CULTURE/COLONY COUNT: CPT | Performed by: NURSE PRACTITIONER

## 2022-05-09 ASSESSMENT — ENCOUNTER SYMPTOMS
DIZZINESS: 0
FEVER: 0
EYE PAIN: 0
NAUSEA: 0
FREQUENCY: 0
HEMATOCHEZIA: 0
HEADACHES: 0
COUGH: 0
DYSURIA: 0
PARESTHESIAS: 0
HEMATURIA: 0
HEARTBURN: 0
ARTHRALGIAS: 0
ABDOMINAL PAIN: 0
CHILLS: 0
NERVOUS/ANXIOUS: 0
WEAKNESS: 1
JOINT SWELLING: 0
SORE THROAT: 0
MYALGIAS: 0
SHORTNESS OF BREATH: 0
CONSTIPATION: 0
BREAST MASS: 0
PALPITATIONS: 0
DIARRHEA: 0

## 2022-05-09 ASSESSMENT — ACTIVITIES OF DAILY LIVING (ADL): CURRENT_FUNCTION: NO ASSISTANCE NEEDED

## 2022-05-09 NOTE — PATIENT INSTRUCTIONS
Preventive Health Recommendations    See your health care provider every year to    Review health changes.     Discuss preventive care.      Review your medicines if your doctor has prescribed any.      You no longer need a yearly Pap test unless you've had an abnormal Pap test in the past 10 years. If you have vaginal symptoms, such as bleeding or discharge, be sure to talk with your provider about a Pap test.      Every 1 to 2 years, have a mammogram.  If you are over 69, talk with your health care provider about whether or not you want to continue having screening mammograms.      Every 10 years, have a colonoscopy. Or, have a yearly FIT test (stool test). These exams will check for colon cancer.       Have a cholesterol test every 5 years, or more often if your doctor advises it.       Have a diabetes test (fasting glucose) every three years. If you are at risk for diabetes, you should have this test more often.       At age 65, have a bone density scan (DEXA) to check for osteoporosis (brittle bone disease).    Shots:    Get a flu shot each year.    Get a tetanus shot every 10 years.    Talk to your doctor about your pneumonia vaccines. There are now two you should receive - Pneumovax (PPSV 23) and Prevnar (PCV 13).    Talk to your pharmacist about the shingles vaccine.    Talk to your doctor about the hepatitis B vaccine.    Nutrition:     Eat at least 5 servings of fruits and vegetables each day.      Eat whole-grain bread, whole-wheat pasta and brown rice instead of white grains and rice.      Get adequate about Calcium and Vitamin D.     Lifestyle    Exercise at least 150 minutes a week (30 minutes a day, 5 days a week). This will help you control your weight and prevent disease.      Limit alcohol to one drink per day.      No smoking.       Wear sunscreen to prevent skin cancer.       See your dentist twice a year for an exam and cleaning.      See your eye doctor every 1 to 2 years to screen for  conditions such as glaucoma, macular degeneration, cataracts, etc.    Personalized Prevention Plan  You are due for the preventive services outlined below.  Your care team is available to assist you in scheduling these services.  If you have already completed any of these items, please share that information with your care team to update in your medical record.    Health Maintenance Due   Topic Date Due     Diptheria Tetanus Pertussis (DTAP/TDAP/TD) Vaccine (1 - Tdap) Never done     Zoster (Shingles) Vaccine (1 of 2) Never done     Pneumococcal Vaccine (1 - PCV) Never done     Kidney Microalbumin Urine Test  09/15/2009     Cholesterol Lab  06/20/2019     COVID-19 Vaccine (3 - Booster for Pfizer series) 09/12/2021     ANNUAL REVIEW OF HM ORDERS  11/24/2021     FALL RISK ASSESSMENT  11/24/2021

## 2022-05-09 NOTE — PROGRESS NOTES
"SUBJECTIVE:   Barb Marie is a 82 year old female who presents for Preventive Visit.    Patient has been advised of split billing requirements and indicates understanding: Yes  Are you in the first 12 months of your Medicare coverage?  No    Healthy Habits:     In general, how would you rate your overall health?  Good    Frequency of exercise:  1 day/week    Duration of exercise:  Less than 15 minutes    Do you usually eat at least 4 servings of fruit and vegetables a day, include whole grains    & fiber and avoid regularly eating high fat or \"junk\" foods?  No    Taking medications regularly:  Yes    Medication side effects:  None    Ability to successfully perform activities of daily living:  No assistance needed    Home Safety:  No safety concerns identified    Hearing Impairment:  Difficult to understand a speaker at a public meeting or Buddhism service    In the past 6 months, have you been bothered by leaking of urine?  No    In general, how would you rate your overall mental or emotional health?  Good      PHQ-2 Total Score: 0    Additional concerns today:  No    Do you feel safe in your environment? Yes    Have you ever done Advance Care Planning? (For example, a Health Directive, POLST, or a discussion with a medical provider or your loved ones about your wishes): Yes, patient states has an Advance Care Planning document and will bring a copy to the clinic.    Fall risk  Fallen 2 or more times in the past year?: No  Any fall with injury in the past year?: No    Cognitive Screening   1) Repeat 3 items (Leader, Season, Table)    2) Clock draw: NORMAL  3) 3 item recall: Recalls NO objects   Results: Normal Clock, No Recall on 3 words    Mini-CogTM Copyright ARIK Robertson. Licensed by the author for use in [facility]; reprinted with permission ([email or web address]@.[email or web address]). All rights reserved.      Neighbor with her today-Dinora.  She often looks to Dinora to Help Her Answer Questions and Give Her Advice on " "What She Should Do at Today's Visit.  Weakness has tried cane founds no great benefit.  Vitamin B12 deficiency had not been taking her injections has restarted these.  NO family no children. Lives alone. Dinora is her neighbor checks on her.   NO falls but Dinora states Barb will have weakness in her legs and \"almost goes down\".   Weakness in legs has been slowly evolving over last 1 year.     Declines DEXA scan.   Wants COVID booster wait on other vaccines.     Wt Readings from Last 5 Encounters:   05/09/22 77.6 kg (171 lb)   03/07/22 75.8 kg (167 lb)   11/24/20 78.5 kg (173 lb)   06/14/19 72.6 kg (160 lb)   06/12/18 75.3 kg (166 lb)     Do you have sleep apnea, excessive snoring or daytime drowsiness?: no    Reviewed and updated as needed this visit by clinical staff   Tobacco  Allergies  Meds  Problems  Med Hx  Surg Hx  Fam Hx            Reviewed and updated as needed this visit by Provider   Tobacco  Allergies  Meds  Problems  Med Hx  Surg Hx  Fam Hx           Social History     Tobacco Use     Smoking status: Never Smoker     Smokeless tobacco: Never Used   Substance Use Topics     Alcohol use: No     If you drink alcohol do you typically have >3 drinks per day or >7 drinks per week? No    Alcohol Use 5/9/2022   Prescreen: >3 drinks/day or >7 drinks/week? Not Applicable   Prescreen: >3 drinks/day or >7 drinks/week? -     Current providers sharing in care for this patient include:   Patient Care Team:  Brendon Stinson as PCP - Reba Berman, JENNIFER CNP as Assigned PCP    The following health maintenance items are reviewed in Epic and correct as of today:  Health Maintenance Due   Topic Date Due     MICROALBUMIN  09/15/2009     LIPID  06/20/2019     FALL RISK ASSESSMENT  11/24/2021     Lab work is in process  Labs reviewed in EPIC  BP Readings from Last 3 Encounters:   05/09/22 128/74   03/07/22 118/62   11/24/20 128/78    Wt Readings from Last 3 Encounters:   05/09/22 77.6 kg " "(171 lb)   03/07/22 75.8 kg (167 lb)   11/24/20 78.5 kg (173 lb)          Patient Active Problem List   Diagnosis     Mixed hyperlipidemia     HYPERLIPIDEMIA LDL GOAL <130     Advanced directives, counseling/discussion     Pernicious anemia--Vitamin B12 injections monthly     Glaucoma associated with underlying disease     Chronic kidney disease, stage 3a (H)     Past Surgical History:   Procedure Laterality Date     CHOLECYSTECTOMY  2008    [facility]     TONSILLECTOMY & ADENOIDECTOMY      As a child       Social History     Tobacco Use     Smoking status: Never Smoker     Smokeless tobacco: Never Used   Substance Use Topics     Alcohol use: No     Family History   Problem Relation Age of Onset     Heart Disease Father          Current Outpatient Medications   Medication Sig Dispense Refill     brimonidine (ALPHAGAN) 0.2 % ophthalmic solution INSTILL 1 DROP INTO EACH EYE TWICE DAILY  3     COSOPT 2-0.5 % OP SOLN instill 1 drop ou BID  0     cyanocobalamin (CYANOCOBALAMIN) 1000 MCG/ML injection Inject 1 mL (1,000 mcg) into the muscle every 30 days 3 mL 3     VITAMIN D, CHOLECALCIFEROL, PO Take 1,000 Units by mouth daily       syringe 22G X 1-1/2\" 3 ML MISC 1 Syringe every 30 days B12  monthly 12 each 1     Syringe/Needle, Disp, (B-D SYRINGE/NEEDLE) 25G X 5/8\" 3 ML MISC 1 Syringe every 30 days Vitamin B12 monthly 12 each 1     Allergies   Allergen Reactions     Atorvastatin GI Disturbance     Pt is allergic to Lipitor.     Hmg-Coa-R Inhibitors      jaundice     Mammogram Screening: Mammogram Screening - Patient over age 75, has elected to discontinue screenings.    Review of Systems   Constitutional: Negative for chills and fever.   HENT: Negative for congestion, ear pain, hearing loss and sore throat.    Eyes: Negative for pain and visual disturbance.   Respiratory: Negative for cough and shortness of breath.    Cardiovascular: Negative for chest pain, palpitations and peripheral edema. " "  Gastrointestinal: Negative for abdominal pain, constipation, diarrhea, heartburn, hematochezia and nausea.   Breasts:  Negative for tenderness, breast mass and discharge.   Genitourinary: Negative for dysuria, frequency, genital sores, hematuria, pelvic pain, urgency, vaginal bleeding and vaginal discharge.   Musculoskeletal: Negative for arthralgias, joint swelling and myalgias.   Skin: Negative for rash.   Neurological: Positive for weakness. Negative for dizziness, headaches and paresthesias.   Psychiatric/Behavioral: Negative for mood changes. The patient is not nervous/anxious.      OBJECTIVE:   /74 (BP Location: Right arm, Patient Position: Sitting, Cuff Size: Adult Regular)   Pulse 74   Temp 97.7  F (36.5  C) (Tympanic)   Ht 1.676 m (5' 6\")   Wt 77.6 kg (171 lb)   SpO2 94%   Breastfeeding No   BMI 27.60 kg/m   Estimated body mass index is 27.6 kg/m  as calculated from the following:    Height as of this encounter: 1.676 m (5' 6\").    Weight as of this encounter: 77.6 kg (171 lb).  Physical Exam  GENERAL: healthy, alert and no distress, forgetful  EYES: Eyes grossly normal to inspection, PERRL and conjunctivae and sclerae normal  HENT: ear canals and TM's normal, nose and mouth without ulcers or lesions  NECK: no adenopathy, no asymmetry, masses, or scars and thyroid normal to palpation  RESP: lungs clear to auscultation - no rales, rhonchi or wheezes  BREAST: left normal without masses, tenderness or nipple discharge and no palpable axillary masses or adenopathy; right solid tender density mid breast 8-10 o'clock position no nipple discharge and no palpable axillary masses or adenopathy;   CV: bradycardia, normal rhythm, normal S1 S2, no S3 or S4, no murmur, click or rub, has peripheral edema and peripheral pulses strong  ABDOMEN: soft, nontender, no hepatosplenomegaly, no masses and bowel sounds normal  MS: no gross musculoskeletal defects noted, bilateral equal lower leg edema slight left leg " limp  SKIN: no suspicious lesions or rashes  NEURO: mentation intact and speech normal; strength decreased and left leg compared to right as well as left ; CN 2-12 intact  PSYCH: mentation appears normal, affect flat  LYMPH: no cervical, supraclavicular, axillary, or inguinal adenopathy    Diagnostic Test Results:  Labs reviewed in Epic    ASSESSMENT / PLAN:   Barb was seen today for physical, injections and immunization.    Diagnoses and all orders for this visit:    Encounter for Medicare annual wellness exam  Well woman exam with breast exam completed today.    Yearly exam recommended   Fasting labs today.    Will notify of lab results.  -     REVIEW OF HEALTH MAINTENANCE PROTOCOL ORDERS    Pernicious anemia  Continue B12 injections.   -     Vitamin B12; Future  -     Vitamin B12    Memory loss  Concern about her memory loss.    Restarted vitamin B12 injections which have some benefit.  Memory loss is felt to be multifactorial.  Recheck urine today given recent UTI.  Forgetful in clinic today short-term memory noted to be altered.  Discussed safety at home as she lives alone.  She declines care coordination referral.  Relies on her neighbor friend Dinora for help. Dinora will be gone next week.   Discussed at home services as well as life alert as I would consider her high risk for falls and she declines.  Recommend MRI given left-sided weakness and memory loss she declines.  Discussed with care coordination they recommend community paramedic for home safety assessment.  Able to get this ordered and set up for tomorrow.  Neurology referral placed she reports she will complete this evaluation and further plan based on their assessment.  Red flag symptoms discussed and if these occur present to the emergency room or call 911.  CTC for Dinora done today.   Barb and Dinora verbalizes understanding of plan of care and is in agreement.   -     Adult Neurology  Referral; Future    Weakness of both lower  extremities  Generalized muscle weakness  Left-sided weakness  Symptoms for about a year progressively worsening.  Unknown etiology.  Lives alone and concerned that her symptoms and increased fall risk with them.  Encouraged her to rule out cardiac and neurologic causes.  Cardiac imaging ordered in March she did not pursue.  Lumbar x-ray pending official radiology read.  Concern for previous neurologic process such as stroke given weakness more profound on the left.  She declines MRI of her Brain.  Declines life alert.  Declines at home services or care coordination.  May benefit from physical therapy for strength.  Red flag symptoms discussed and if these occur present to the emergency room or call 911.  Emerson verbalizes understanding of plan of care and is in agreement.   -     Cancel: XR Lumbar Spine 2/3 Views; Future  -     CK total; Future  -     Echocardiogram Complete; Future  -     CK total  -     EKG 12-lead complete w/read - Clinics  -     Echocardiogram Complete; Future  -     Adult Neurology  Referral; Future  -     Adult Neurology  Referral; Future    Chronic kidney disease, stage 3a (H)  -     Albumin Random Urine Quantitative with Creat Ratio; Future  -     Albumin Random Urine Quantitative with Creat Ratio    Unspecified abnormalities of breathing   Bilateral leg edema  Nonspecific abnormal electrocardiogram (ECG) (EKG)  Concern for distant MI or other cardiac etiology such as CHF.  Labs.   We were able to set up her echocardiogram and her stress test this week.  Appointment dates and times provided to her friend Dinora who is with her in clinic today and she is able to take her.  IF she has any chest pain, shortness of breath, syncope or near syncope or all she needs ED or 911.   Emerson verbalizes understanding of plan of care and is in agreement.   -     Cancel: Echocardiogram Complete; Future  -     NM Lexiscan stress test; Future  -     BNP-N terminal pro;  "Future  -     Echocardiogram Complete; Future  -     BNP-N terminal pro    Acute cystitis without hematuria  -     Urine Culture Aerobic Bacterial - lab collect    Mixed hyperlipidemia  -     Lipid panel reflex to direct LDL Fasting; Future  -     Lipid panel reflex to direct LDL Fasting    Need for COVID-19 vaccine  -     COVID-19,PF,PFIZER (12+ Yrs GRAY LABEL)    Abnormal breast exam  -     MA Diagnostic Bilateral w/Lakhwinder; Future  -     US Breast Right Limited 1-3 Quadrants; Future    Patient has been advised of split billing requirements and indicates understanding: Yes    COUNSELING:  Reviewed preventive health counseling, as reflected in patient instructions       Regular exercise       Healthy diet/nutrition       Fall risk prevention    Estimated body mass index is 27.6 kg/m  as calculated from the following:    Height as of this encounter: 1.676 m (5' 6\").    Weight as of this encounter: 77.6 kg (171 lb).    She reports that she has never smoked. She has never used smokeless tobacco.    Appropriate preventive services were discussed with this patient, including applicable screening as appropriate for cardiovascular disease, diabetes, osteopenia/osteoporosis, and glaucoma.  As appropriate for age/gender, discussed screening for colorectal cancer, prostate cancer, breast cancer, and cervical cancer. Checklist reviewing preventive services available has been given to the patient.    Reviewed patients plan of care and provided an AVS. The Basic Care Plan (routine screening as documented in Health Maintenance) for Barb meets the Care Plan requirement. This Care Plan has been established and reviewed with the Patient and friend Dinora.    Counseling Resources:  ATP IV Guidelines  Pooled Cohorts Equation Calculator  Breast Cancer Risk Calculator  Breast Cancer: Medication to Reduce Risk  FRAX Risk Assessment  ICSI Preventive Guidelines  Dietary Guidelines for Americans, 2010  USDA's MyPlate  ASA Prophylaxis  Lung " CA Screening      JENNIFER Worthington CNP  [facility] [place]    Identified Health Risks:

## 2022-05-09 NOTE — PROGRESS NOTES
The Community Paramedic reached out and scheduled an in home visit for 5/10/22.    Penny Bang NRP, CP  Community Paramedic  Phone number [phone]  [email or web address]

## 2022-05-11 LAB — BACTERIA UR CULT: NORMAL

## 2022-05-12 ENCOUNTER — HOSPITAL ENCOUNTER (OUTPATIENT)
Dept: NUCLEAR MEDICINE | Facility: CLINIC | Age: 82
Setting detail: NUCLEAR MEDICINE
Discharge: HOME | End: 2022-05-12
Attending: NURSE PRACTITIONER
Payer: MEDICARE

## 2022-05-12 ENCOUNTER — HOSPITAL ENCOUNTER (OUTPATIENT)
Dept: CARDIOLOGY | Facility: CLINIC | Age: 82
Discharge: HOME | End: 2022-05-12
Attending: NURSE PRACTITIONER
Payer: MEDICARE

## 2022-05-12 VITALS — DIASTOLIC BLOOD PRESSURE: 81 MMHG | SYSTOLIC BLOOD PRESSURE: 142 MMHG | HEART RATE: 59 BPM

## 2022-05-12 DIAGNOSIS — R94.31 NONSPECIFIC ABNORMAL ELECTROCARDIOGRAM (ECG) (EKG): ICD-10-CM

## 2022-05-12 LAB
CV STRESS MAX HR HE: 60
RATE PRESSURE PRODUCT: 8220
STRESS ECHO BASELINE DIASTOLIC HE: 81
STRESS ECHO BASELINE HR: 99 BPM
STRESS ECHO BASELINE SYSTOLIC BP: 142
STRESS ECHO CALCULATED PERCENT HR: 44 %
STRESS ECHO LAST STRESS DIASTOLIC BP: 82
STRESS ECHO LAST STRESS SYSTOLIC BP: 137
STRESS ECHO TARGET HR: 136

## 2022-05-12 PROCEDURE — 93016 CV STRESS TEST SUPVJ ONLY: CPT | Performed by: INTERNAL MEDICINE

## 2022-05-12 PROCEDURE — A9502 TC99M TETROFOSMIN: HCPCS | Performed by: NURSE PRACTITIONER

## 2022-05-12 PROCEDURE — G1004 CDSM NDSC: HCPCS | Performed by: INTERNAL MEDICINE

## 2022-05-12 PROCEDURE — 93017 CV STRESS TEST TRACING ONLY: CPT

## 2022-05-12 PROCEDURE — 93018 CV STRESS TEST I&R ONLY: CPT | Performed by: INTERNAL MEDICINE

## 2022-05-12 PROCEDURE — 78452 HT MUSCLE IMAGE SPECT MULT: CPT | Mod: 26 | Performed by: INTERNAL MEDICINE

## 2022-05-12 PROCEDURE — 78452 HT MUSCLE IMAGE SPECT MULT: CPT | Mod: MG

## 2022-05-12 PROCEDURE — 250N000011 HC RX IP 250 OP 636

## 2022-05-12 PROCEDURE — 343N000001 HC RX 343: Performed by: NURSE PRACTITIONER

## 2022-05-12 RX ORDER — REGADENOSON 0.08 MG/ML
INJECTION, SOLUTION INTRAVENOUS
Status: COMPLETED
Start: 2022-05-12 | End: 2022-05-12

## 2022-05-12 RX ADMIN — REGADENOSON 0.4 MG: 0.08 INJECTION, SOLUTION INTRAVENOUS at 11:19

## 2022-05-12 RX ADMIN — TETROFOSMIN 30.5 MCI.: 1.38 INJECTION, POWDER, LYOPHILIZED, FOR SOLUTION INTRAVENOUS at 11:47

## 2022-05-12 RX ADMIN — TETROFOSMIN 11 MCI.: 1.38 INJECTION, POWDER, LYOPHILIZED, FOR SOLUTION INTRAVENOUS at 09:03

## 2022-05-13 ENCOUNTER — HOSPITAL ENCOUNTER (OUTPATIENT)
Dept: CARDIOLOGY | Facility: CLINIC | Age: 82
Discharge: HOME | End: 2022-05-13
Attending: NURSE PRACTITIONER | Admitting: NURSE PRACTITIONER
Payer: MEDICARE

## 2022-05-13 DIAGNOSIS — R94.31 NONSPECIFIC ABNORMAL ELECTROCARDIOGRAM (ECG) (EKG): ICD-10-CM

## 2022-05-13 DIAGNOSIS — M62.81 GENERALIZED MUSCLE WEAKNESS: ICD-10-CM

## 2022-05-13 DIAGNOSIS — R29.898 WEAKNESS OF BOTH LOWER EXTREMITIES: ICD-10-CM

## 2022-05-13 LAB — LVEF ECHO: NORMAL

## 2022-05-13 PROCEDURE — 93306 TTE W/DOPPLER COMPLETE: CPT

## 2022-05-13 PROCEDURE — 93306 TTE W/DOPPLER COMPLETE: CPT | Mod: 26 | Performed by: INTERNAL MEDICINE

## 2022-05-13 NOTE — RESULT ENCOUNTER NOTE
Labs look good except slighly low HDL and slight high LDL.   Note to be called.     YVONNE Worthington  [facility]

## 2022-05-13 NOTE — RESULT ENCOUNTER NOTE
Note to Staff: please call the patient to explain results and to check on current symptoms.  Also need to update her contact and neighbor Dinora as Barb is having memory difficulties. I would be concerned she would not remember this info; CTC on file. Dinora would be the one to take her to any appointments.    Community paramedic is checking on her and assessing home safety they saw her this week concern for stairs going down to her basement.     Her Echo is concerning for small IVC ?  Near complete collapse with inspiration this makes me concerned about her venous return as possible cause of her symptoms of fatigue and weakness.  I would like her to consult with cardiology.  I have sent this referral.    Interpretation Summary-Left ventricular size, global systolic function, and wall motion are normal,   estimated LVEF 55-60%.   Right ventricular global function is normal.   No significant valvular abnormalities.   IVC diameter is small (<1cm), with near complete collapse with inspiration,   suggesting relative hypovolemia.         Lexiscan is normal.     Back has degenerative changes. Will consider PT after she sees cardiology.   IMPRESSION: Nomenclature is based on five lumbar vertebral bodies. No   gross vertebral body height loss. Mild dextroconvex curvature   predominantly involving the partially visualized thoracic spine.   Alignment otherwise appears within normal limits. Mild-to-moderate   multilevel degenerative disc space narrowing with marginal endplate   osteophytes. Mild/moderate multilevel degenerative facet arthropathy.   Atherosclerotic calcifications of the abdominal aorta. Surgical clips   in the right upper quadrant of the abdomen, new compared to prior   exam, which are presumably related to interval cholecystectomy. Please   correlate with the patient's clinical history.       Labs all look good except slightly elevated LDL and slightly low HDL.       Reba Martino, FNP-BC

## 2022-05-13 NOTE — RESULT ENCOUNTER NOTE
IVC diameter is small (<1cm), with near complete collapse with inspiration,  suggesting relative hypovolemia. Referral to cardiology.   In another encounter to call.     YVONNE Worthington  [facility]

## 2022-05-17 ENCOUNTER — TELEPHONE (OUTPATIENT)
Dept: FAMILY MEDICINE | Facility: CLINIC | Age: 82
End: 2022-05-17
Payer: MEDICARE

## 2022-05-17 NOTE — TELEPHONE ENCOUNTER
LING Farias (ctc) was Advised of results/result note from 5/9/22 - Patient's friend  stated an understanding and agreed with plan.    cardiology appointmentment has been set up     Future Appointments 5/17/2022 - 11/13/2022      Date Visit Type Length Department Provider     5/24/2022 10:00 AM COMMUNITY PARAMEDIC 60 min  COMMUNITY PARAMEDIC [facility] PARAMEDIC 3              5/31/2022 12:30 PM MA DIAGNOSTIC BILAT W/ NGA 30 min [facility] BREAST CENTER [id number]    Location Instructions:     [facility] Medical Office Building [address], [address] [place] [id number]   Parking  [facility] customers can park in the lot adjacent to the entrance to the [facility].  Entrance and check-in location  Enter at the front entrance, under the canopy. Take the stairs or elevator from the main entrance to the 2nd floor. Please check-in for your appointment at the desk in the Breast Center waiting area.              5/31/2022  1:00 PM US BREAST RT LMT 1-3 QUAD 30 min RH ULTRASOUND BREAST [id number]    Location Instructions:     [facility] Medical Office Building [address], [address] [place] [address]  [facility] can park in the lot adjacent to the entrance to the [facility] Building.  Entrance and check-in location Enter at the front entrance, under the canopy. Take the stairs or elevator from the main entrance to the 2nd floor. Please check-in for your appointment at the desk in the Breast Center waiting area.              6/17/2022 10:45 AM NEW CARDIOLOGY 30 min [facility] Ravi Flores MD    Location Instructions:     Our clinic is located at [address], [place] [address]. You can park your vehicle at the parking ramp west of [facility] across the street from [facility]. You will cross  the skyway to access our clinic on the 2nd floor.              9/30/2022  8:30 AM NEW 60 min CS NEUROLOGY Jonathan Bermudez MD    Location Instructions:     [address] [place] [address], [address] [place] [id number]                 Brionna CATHERINE RN   [facility] Triage

## 2022-05-24 ENCOUNTER — PATIENT OUTREACH (OUTPATIENT)
Dept: OTHER | Facility: CLINIC | Age: 82
End: 2022-05-24
Payer: MEDICARE

## 2022-05-24 NOTE — PROGRESS NOTES
The Community Paramedic reached out to the patient to remind her we had an appointment this morning.  She declined another appointment and I will close her out.  Please place another referral if future needs arise.    Thank you,    Penny Bang NRP, CP  Community Paramedic  Phone number [phone]  [email or web address]

## 2022-05-31 ENCOUNTER — HOSPITAL ENCOUNTER (OUTPATIENT)
Dept: MAMMOGRAPHY | Facility: CLINIC | Age: 82
Discharge: HOME | End: 2022-05-31
Attending: NURSE PRACTITIONER
Payer: MEDICARE

## 2022-05-31 ENCOUNTER — HOSPITAL ENCOUNTER (OUTPATIENT)
Dept: ULTRASOUND IMAGING | Facility: CLINIC | Age: 82
Discharge: HOME | End: 2022-05-31
Attending: NURSE PRACTITIONER
Payer: MEDICARE

## 2022-05-31 DIAGNOSIS — N64.59 ABNORMAL BREAST EXAM: ICD-10-CM

## 2022-05-31 PROCEDURE — 76642 ULTRASOUND BREAST LIMITED: CPT | Mod: RT

## 2022-05-31 PROCEDURE — 77066 DX MAMMO INCL CAD BI: CPT

## 2022-05-31 NOTE — LETTER
[facility]  [address] [place] [address]  [phone]                    Josseline 3, 2022    Barb Marie  [address] [place] [address]      Dear Barb,    Here is a summary of your recent test results:      Thankfully Mammogram was normal.  ADVISE: rechecking in 1 year.     Your test results are enclosed.      Please contact me if you have any questions.               Thank you very much for trusting [facility].     Healthy regards,      Reba Martino, FNP-BC         Results for orders placed or performed during the hospital encounter of 05/31/22   US Breast Right Limited 1-3 Quadrants     Status: None    Narrative    Examination: Bilateral digital diagnostic mammography and digital  breast tomosynthesis with computer aided detection, and focused  ultrasound of the RIGHT breast, 5/31/2022.    Comparison: 5/20/2013    History: Physician palpated thickening laterally in the RIGHT breast.    BREAST DENSITY: Scattered fibroglandular densities    Findings: Bilateral mammography with digital breast tomosynthesis was  performed with a triangle-shaped marker laterally in the RIGHT breast  at the area of concern. No concerning mammographic/tomographic  findings on either side.    Focussed ultrasound of the lateral RIGHT breast was performed. Normal  fibroglandular tissue is seen at the palpable area of concern.  Incidentally noted small benign cyst measures up to 0.4 cm in maximal  dimension.      Impression    IMPRESSION: BI-RADS CATEGORY: 2 - Benign Finding(s).    RECOMMENDED FOLLOW-UP: Annual Mammography.      The patient was given the results of the examination.      NGUYEN DOMINGUEZ MD         SYSTEM ID:  [id number]   Results for orders placed or performed during the hospital encounter of 05/31/22   MA Diagnostic Bilateral w/Lakhwinder     Status: None    Narrative    Examination: Bilateral digital diagnostic mammography and digital  breast  tomosynthesis with computer aided detection, and focused  ultrasound of the RIGHT breast, 5/31/2022.    Comparison: 5/20/2013    History: Physician palpated thickening laterally in the RIGHT breast.    BREAST DENSITY: Scattered fibroglandular densities    Findings: Bilateral mammography with digital breast tomosynthesis was  performed with a triangle-shaped marker laterally in the RIGHT breast  at the area of concern. No concerning mammographic/tomographic  findings on either side.    Focussed ultrasound of the lateral RIGHT breast was performed. Normal  fibroglandular tissue is seen at the palpable area of concern.  Incidentally noted small benign cyst measures up to 0.4 cm in maximal  dimension.      Impression    IMPRESSION: BI-RADS CATEGORY: 2 - Benign Finding(s).    RECOMMENDED FOLLOW-UP: Annual Mammography.      The patient was given the results of the examination.      NGUYEN DOMINGUEZ MD         SYSTEM ID:  [id number]

## 2022-05-31 NOTE — RESULT ENCOUNTER NOTE
Staff please send letter.     Dear Barb,    Here is a summary of your recent test results:    Thankfully Mammogram was normal.  ADVISE: rechecking in 1 year.    Please call us at [phone] (or use [employer]) to address the above recommendations or have any questions.    Thank you for choosing [facility].  It was an honor and a privilege to participate in your care.     Healthy regards,    Reba Martino, FNP-BC

## 2022-06-14 ENCOUNTER — TELEPHONE (OUTPATIENT)
Dept: FAMILY MEDICINE | Facility: CLINIC | Age: 82
End: 2022-06-14
Payer: MEDICARE

## 2022-06-14 NOTE — TELEPHONE ENCOUNTER
Cardiology calling stating that pt was referred to them for: as it states in echo: IVC diameter is small (<1cm), with near complete collapse with inspiration,  suggesting relative hypovolemia. Also the BNP was 101 - this is all suggesting that pt just needs to drink more water.     Cardiology is wondering of pt should be seeing them or just have pt increase fluid intake?     Pt is scheduled with them at the end of this week on 6/16/2022    Routing to covering provider while AL,NP is out     [phone] - Sherin RN      Please advise     Thank you     Beverly England RN, BSN  [facility]

## 2022-06-14 NOTE — TELEPHONE ENCOUNTER
Recommend keeping appt.  Pt was also having weakness      Kim Bonner MBA, MS, PA-C  [facility]

## 2022-06-15 ENCOUNTER — TELEPHONE (OUTPATIENT)
Dept: CARDIOLOGY | Facility: CLINIC | Age: 82
End: 2022-06-15
Payer: MEDICARE

## 2022-06-15 NOTE — TELEPHONE ENCOUNTER
Called Pt and caretaker asked they keep appt with cardiology, and asked that Pt drink extra water. SAMANTHA Yancey RN

## 2022-06-15 NOTE — TELEPHONE ENCOUNTER
Writer called Sherin and Team 6 cardiology to advised continue with appt. LVM to call back if questions or concerns.    Rodriguez CURRAN RN   [facility] [place][note 2]

## 2022-06-17 ENCOUNTER — OFFICE VISIT (OUTPATIENT)
Dept: CARDIOLOGY | Facility: CLINIC | Age: 82
End: 2022-06-17
Attending: NURSE PRACTITIONER
Payer: MEDICARE

## 2022-06-17 VITALS
OXYGEN SATURATION: 97 % | SYSTOLIC BLOOD PRESSURE: 153 MMHG | HEART RATE: 55 BPM | HEIGHT: 66 IN | DIASTOLIC BLOOD PRESSURE: 77 MMHG | WEIGHT: 165.3 LBS | BODY MASS INDEX: 26.57 KG/M2

## 2022-06-17 DIAGNOSIS — M62.81 GENERALIZED MUSCLE WEAKNESS: ICD-10-CM

## 2022-06-17 DIAGNOSIS — R93.1 ABNORMAL ECHOCARDIOGRAM: ICD-10-CM

## 2022-06-17 DIAGNOSIS — R94.31 ABNORMAL EKG: Primary | ICD-10-CM

## 2022-06-17 DIAGNOSIS — R29.898 WEAKNESS OF BOTH LOWER EXTREMITIES: ICD-10-CM

## 2022-06-17 PROCEDURE — 99203 OFFICE O/P NEW LOW 30 MIN: CPT | Performed by: INTERNAL MEDICINE

## 2022-06-17 NOTE — PROGRESS NOTES
HPI and Plan:   I Had the Pleasure of Barb Marie in cardiology clinic on request of Reba Martino nurse practitioner for abnormal echocardiogram which was showing small inferior vena cava.     She is accompanied by her friend.  She is a 82-year-old female with no history of heart disease.  She has no history of hypertension.  She does have some mildly elevated A1c of 5.8 and may have some prediabetes.     She has no chest pain or shortness of breath.  She is able to walk about half a block without stopping.  She has some more weakness in her lower extremities, right greater than left and is going to see a neurology.  An EKG was done by her primary care provider and revealed sinus bradycardia with T wave inversions in the anterior leads.  Because of abnormal EKG, an echocardiogram as well as a Lexiscan stress nuclear study was ordered.  The echocardiogram revealed normal ejection fraction.  No valvular abnormalities were noted.  IVC was small in size and the reader felt this could be related to hypovolemia and therefore she was sent here for consultation.  Lexiscan stress nuclear study revealed no ischemia or infarction.  EF was greater than 70%.  This test results were reviewed with her and her friend.    Her blood pressure is elevated today which is a new finding.  She is not on any cardiac medications at this time.    Exam, regular rate and rhythm with possible S4.  No murmurs were heard    Imaging, I reviewed the echo images myself.  There is normal LV systolic function.  Given the T wave inversions in anterior leads, I was concerned about possibility of apical hypertrophic cardiomyopathy.  Unfortunately, contrast was not used on echo and the echo it does reveal apical foreshortening.  Therefore, [place] was not very well assessed.    Impression    1.  Abnormal EKG  EKG reveals T wave inversions in anterior leads.  Lexiscan stress nuclear study was negative for ischemia.  EF is normal both on echo as well as nuclear  "stress testing.  Given the echo showed apical foreshortening and does not rule out apical hypertrophic, I would recommend a cardiac MRI to assess for possibility of apical hypertrophic cardiomyopathy.  My clinical suspicion is somewhat low though.    2.  Small inferior vena cava  Explained to the patient and her friend that this is usually a reflection of intravascular volume depletion and she admits that she her hydration is not adequate.  We talked about improving intake of fluids.    3.  Elevated blood pressure without formal diagnosis of hypertension, blood pressure was elevated today for the first time.  I suggested follow-up with her primary care physician to monitor blood pressures closely    4.  Leg weakness, has upcoming neurology appointment.    We will call her with the results of the cardiac MRI.  If it is negative, she can continue to follow with her primary care provider    Sincerely,    Ravi Flores MD          No orders of the defined types were placed in this encounter.      No orders of the defined types were placed in this encounter.      There are no discontinued medications.      Encounter Diagnoses   Name Primary?     Weakness of both lower extremities      Generalized muscle weakness      Abnormal echocardiogram      Abnormal EKG Yes       CURRENT MEDICATIONS:  Current Outpatient Medications   Medication Sig Dispense Refill     brimonidine (ALPHAGAN) 0.2 % ophthalmic solution INSTILL 1 DROP INTO EACH EYE TWICE DAILY  3     COSOPT 2-0.5 % OP SOLN instill 1 drop ou BID  0     cyanocobalamin (CYANOCOBALAMIN) 1000 MCG/ML injection Inject 1 mL (1,000 mcg) into the muscle every 30 days 3 mL 3     syringe 22G X 1-1/2\" 3 ML MISC 1 Syringe every 30 days B12  monthly 12 each 1     Syringe/Needle, Disp, (B-D SYRINGE/NEEDLE) 25G X 5/8\" 3 ML MISC 1 Syringe every 30 days Vitamin B12 monthly 12 each 1     VITAMIN D, CHOLECALCIFEROL, PO Take 1,000 Units by mouth daily         ALLERGIES     Allergies " "  Allergen Reactions     Atorvastatin GI Disturbance     Pt is allergic to Lipitor.     Hmg-Coa-R Inhibitors      jaundice       PAST MEDICAL HISTORY:  Past Medical History:   Diagnosis Date     Other specified glaucoma      Pernicious anemia        PAST SURGICAL HISTORY:  Past Surgical History:   Procedure Laterality Date     CHOLECYSTECTOMY  2008    At [facility]     TONSILLECTOMY & ADENOIDECTOMY      As a child       FAMILY HISTORY:  Family History   Problem Relation Age of Onset     Heart Disease Father        SOCIAL HISTORY:  Social History     Socioeconomic History     Marital status:      Spouse name: None     Number of children: None     Years of education: None     Highest education level: None   Tobacco Use     Smoking status: Never Smoker     Smokeless tobacco: Never Used   Substance and Sexual Activity     Alcohol use: No     Drug use: No     Sexual activity: Not Currently       Review of Systems:  Skin:          Eyes:         ENT:         Respiratory:  Negative shortness of breath;dyspnea on exertion     Cardiovascular:  Negative;palpitations;chest pain;edema;dizziness;lightheadedness      Gastroenterology:        Genitourinary:         Musculoskeletal:         Neurologic:         Psychiatric:         Heme/Lymph/Imm:         Endocrine:  Negative thyroid disorder;diabetes      Physical Exam:  Vitals: BP (!) 153/77 (BP Location: Left arm, Patient Position: Sitting)   Pulse 55   Ht 1.676 m (5' 6\")   Wt 75 kg (165 lb 4.8 oz)   SpO2 97%   BMI 26.68 kg/m      Constitutional:  in no acute distress        Skin:  warm and dry to the touch          Head:           Eyes:  sclera white        Lymph:      ENT:           Neck:  JVP normal        Respiratory:  clear to auscultation         Cardiac: regular rhythm;normal S1 and S2   S4                                                     GI:  not assessed this visit        Extremities and Muscular Skeletal:  no edema              Neurological:  " no gross motor deficits        Psych:  Alert and Oriented x 3        CC  Reba Martino, APRN CNP  [address] [place] [address]

## 2022-06-17 NOTE — LETTER
6/17/2022    Reba Martino, APRN CNP  [address] [facility] [address] [address]    RE: Barb Densonpp       Dear Colleague,     I had the pleasure of seeing Barb Marie in the [facility].  HPI and Plan:   I Had the Pleasure of Barb Marie in cardiology clinic on request of Reba Martino nurse practitioner for abnormal echocardiogram which was showing small inferior vena cava.     She is accompanied by her friend.  She is a 82-year-old female with no history of heart disease.  She has no history of hypertension.  She does have some mildly elevated A1c of 5.8 and may have some prediabetes.     She has no chest pain or shortness of breath.  She is able to walk about half a block without stopping.  She has some more weakness in her lower extremities, right greater than left and is going to see a neurology.  An EKG was done by her primary care provider and revealed sinus bradycardia with T wave inversions in the anterior leads.  Because of abnormal EKG, an echocardiogram as well as a Lexiscan stress nuclear study was ordered.  The echocardiogram revealed normal ejection fraction.  No valvular abnormalities were noted.  IVC was small in size and the reader felt this could be related to hypovolemia and therefore she was sent here for consultation.  Lexiscan stress nuclear study revealed no ischemia or infarction.  EF was greater than 70%.  This test results were reviewed with her and her friend.    Her blood pressure is elevated today which is a new finding.  She is not on any cardiac medications at this time.    Exam, regular rate and rhythm with possible S4.  No murmurs were heard    Imaging, I reviewed the echo images myself.  There is normal LV systolic function.  Given the T wave inversions in anterior leads, I was concerned about possibility of apical hypertrophic cardiomyopathy.  Unfortunately, contrast was not used on echo and the echo it does reveal apical foreshortening.  Therefore,  "[place] was not very well assessed.    Impression    1.  Abnormal EKG  EKG reveals T wave inversions in anterior leads.  Lexiscan stress nuclear study was negative for ischemia.  EF is normal both on echo as well as nuclear stress testing.  Given the echo showed apical foreshortening and does not rule out apical hypertrophic, I would recommend a cardiac MRI to assess for possibility of apical hypertrophic cardiomyopathy.  My clinical suspicion is somewhat low though.    2.  Small inferior vena cava  Explained to the patient and her friend that this is usually a reflection of intravascular volume depletion and she admits that she her hydration is not adequate.  We talked about improving intake of fluids.    3.  Elevated blood pressure without formal diagnosis of hypertension, blood pressure was elevated today for the first time.  I suggested follow-up with her primary care physician to monitor blood pressures closely    4.  Leg weakness, has upcoming neurology appointment.    We will call her with the results of the cardiac MRI.  If it is negative, she can continue to follow with her primary care provider    Sincerely,    Ravi Flores MD          No orders of the defined types were placed in this encounter.      No orders of the defined types were placed in this encounter.      There are no discontinued medications.      Encounter Diagnoses   Name Primary?     Weakness of both lower extremities      Generalized muscle weakness      Abnormal echocardiogram      Abnormal EKG Yes       CURRENT MEDICATIONS:  Current Outpatient Medications   Medication Sig Dispense Refill     brimonidine (ALPHAGAN) 0.2 % ophthalmic solution INSTILL 1 DROP INTO EACH EYE TWICE DAILY  3     COSOPT 2-0.5 % OP SOLN instill 1 drop ou BID  0     cyanocobalamin (CYANOCOBALAMIN) 1000 MCG/ML injection Inject 1 mL (1,000 mcg) into the muscle every 30 days 3 mL 3     syringe 22G X 1-1/2\" 3 ML MISC 1 Syringe every 30 days B12  monthly 12 each 1     " "Syringe/Needle, Disp, (B-D SYRINGE/NEEDLE) 25G X 5/8\" 3 ML MISC 1 Syringe every 30 days Vitamin B12 monthly 12 each 1     VITAMIN D, CHOLECALCIFEROL, PO Take 1,000 Units by mouth daily         ALLERGIES     Allergies   Allergen Reactions     Atorvastatin GI Disturbance     Pt is allergic to Lipitor.     Hmg-Coa-R Inhibitors      jaundice       PAST MEDICAL HISTORY:  Past Medical History:   Diagnosis Date     Other specified glaucoma      Pernicious anemia        PAST SURGICAL HISTORY:  Past Surgical History:   Procedure Laterality Date     CHOLECYSTECTOMY  2008    At [facility]     TONSILLECTOMY & ADENOIDECTOMY      As a child       FAMILY HISTORY:  Family History   Problem Relation Age of Onset     Heart Disease Father        SOCIAL HISTORY:  Social History     Socioeconomic History     Marital status:      Spouse name: None     Number of children: None     Years of education: None     Highest education level: None   Tobacco Use     Smoking status: Never Smoker     Smokeless tobacco: Never Used   Substance and Sexual Activity     Alcohol use: No     Drug use: No     Sexual activity: Not Currently       Review of Systems:  Skin:          Eyes:         ENT:         Respiratory:  Negative shortness of breath;dyspnea on exertion     Cardiovascular:  Negative;palpitations;chest pain;edema;dizziness;lightheadedness      Gastroenterology:        Genitourinary:         Musculoskeletal:         Neurologic:         Psychiatric:         Heme/Lymph/Imm:         Endocrine:  Negative thyroid disorder;diabetes      Physical Exam:  Vitals: BP (!) 153/77 (BP Location: Left arm, Patient Position: Sitting)   Pulse 55   Ht 1.676 m (5' 6\")   Wt 75 kg (165 lb 4.8 oz)   SpO2 97%   BMI 26.68 kg/m      Constitutional:  in no acute distress        Skin:  warm and dry to the touch          Head:           Eyes:  sclera white        Lymph:      ENT:           Neck:  JVP normal        Respiratory:  clear to " auscultation         Cardiac: regular rhythm;normal S1 and S2   S4                                                     GI:  not assessed this visit        Extremities and Muscular Skeletal:  no edema              Neurological:  no gross motor deficits        Psych:  Alert and Oriented x 3        CC  JENNIFER Werner CNP  [address] [place] [address]    Thank you for allowing me to participate in the care of your patient.      Sincerely,     Ravi Flores MD     [facility]

## 2022-07-14 ENCOUNTER — NURSE TRIAGE (OUTPATIENT)
Dept: NURSING | Facility: CLINIC | Age: 82
End: 2022-07-14

## 2022-07-14 NOTE — TELEPHONE ENCOUNTER
Calling about cardiac test results from MRI on June 17th. She hasn't heard back from anyone about it and I can't see results in the chart. Please call patient at:  [phone].  Primary is JENNIFER Serra CNP.    Renae Silverman RN  [facility]    Additional Information    Negative: Nursing judgment    Negative: Nursing judgment    Negative: Nursing judgment    Nursing judgment    Protocols used: INFORMATION ONLY CALL - NO TRIAGE-A-OH

## 2022-07-14 NOTE — TELEPHONE ENCOUNTER
Please call patient I wonder if her confusion is worsening.       She is following with cardiology on this.   Please relay her information to her and plan described at her cardiology appt 6/17/22.     She has not done the MRI yet that cardiology ordered it is next week.     Please have her reach out to cardiology for any other questions. If she seems confused please also reach out to her contact CTC on file to advise on this information.         Reba Martino, YVONNE-BC

## 2022-07-14 NOTE — TELEPHONE ENCOUNTER
Called patient and explained appointment date and time. Had patient write down information and resite back to me.   Gave cardiology phone #  For questions, she denied ?'s..     Called and left a message for Dinora-wanted to review 7/21/22 appoinment information with her.     Future Appointments 7/14/2022 - 1/10/2023              Date Visit Type Length Department Provider     7/21/2022  2:30 PM MR MYOCARDIUM W 90 min SH CV MRI [id number]    Location Instructions:     [facility] is located at [address], [place] [address]. You can park your vehicle at the parking ramp west of [address] across the street from [facility]. You will cross the skyway to access our clinic on 3rd floor. Please call the clinic if you have questions regarding directions at [phone].              9/30/2022  8:30 AM NEW 60 min CS NEUROLOGY Jonathan Bermudez MD    Location Instructions:     [address], [address] [place] [address]                 Brionna CATHERINE RN   [facility] Triage

## 2022-07-14 NOTE — TELEPHONE ENCOUNTER
Dinora called back and verified appointment time.     Fabiola Salvador RN  [facility]

## 2023-02-14 ENCOUNTER — TELEPHONE (OUTPATIENT)
Dept: NEUROLOGY | Facility: CLINIC | Age: 83
End: 2023-02-14
Payer: MEDICARE

## 2023-02-14 NOTE — TELEPHONE ENCOUNTER
Patient has appt on Wednesday February 15th at 8:30 am.    Out bound call to [phone]. S/W Dinora and nadia they are planning to attend the appointment

## 2023-02-14 NOTE — PROGRESS NOTES
INITIAL NEUROLOGY CONSULTATION    DATE OF VISIT: 2/15/2023  CLINIC LOCATION: [facility]  MRN: [id number]  PATIENT NAME: Barb Marie  YOB: 1940    REASON FOR VISIT: No chief complaint on file.    HISTORY OF PRESENT ILLNESS:                                                    Ms. Barb Marie is 82 year old right handed female patient with past medical history of hyperlipidemia, pernicious anemia, and glaucoma, who was seen today for generalized weakness.    Per patient's report, ***.    Laboratory relation from March-May 2022 includes unremarkable CMP, except elevated glucose of 120, elevated hemoglobin A1C of 5.8, normal TSH of 3.62, vitamin D (30), unremarkable CBC, urinalysis positive for leukocyte esterase and nitrates with positive for E. coli urine culture, elevated LDL (121), and low vitamin B12 (198).    No prior brain or spine imaging.  No nerve conduction studies.    No additional useful information is available in Care Everywhere, which was reviewed.  PAST MEDICAL/SURGICAL HISTORY:                                                    I personally reviewed patient's past medical and surgical history with the patient at today's visit.  MEDICATIONS:                                                    I personally reviewed patient's medications and allergies with the patient at today's visit.  ALLERGIES:                                                      Allergies   Allergen Reactions     Atorvastatin GI Disturbance     Pt is allergic to Lipitor.     Hmg-Coa-R Inhibitors      jaundice     EXAM:                                                    VITAL SIGNS:   There were no vitals taken for this visit.  Mini-Cog Assessment:       General: pt is in NAD, cooperative.  Skin: normal turgor, moist mucous membranes, no lesions/rashes noticed.  HEENT: ATNC, EOMI, PERRL, white sclera, normal conjunctiva, no nystagmus or ptosis. No carotid bruits bilaterally.  Respiratory:  lung sounds clear to auscultation bilaterally, no crackles, wheezes, rhonchi. Symmetric lung excursion, no accessory respiratory muscle use.  Cardiovascular: normal S1/S2, no murmurs/rubs/gallops.   Abdomen: Not distended.  : deferred.    Neurological:  Mental: alert, follows commands,  /5 with ***/3 on memory recall, no aphasia or dysarthria. Fund of knowledge is {MYAPPROPRIATE:331911}  Cranial Nerves:  CN II: visual acuity - able to accurately count fingers with each eye. Visual fields intact, fundi: discs sharp, no papilledema and normal vessels bilaterally.  CN III, IV, VI: EOM intact, pupils equal and reactive  CN V: facial sensation nl  CN VII: face symmetric, no facial droop  CN VIII: hearing normal  CN IX: palate elevation symmetric, uvula at midline  CN XI SCM normal, shoulder shrug nl  CN XII: tongue midline  Motor: Strength: 5/5 in all major groups of all extremities. Normal tone. No abnormal movements. No pronator drift b/l.  Reflexes: Triceps, biceps, brachioradialis, patellar, and achilles reflexes normal and symmetric. No clonus noted. Toes are down-going b/l.   Sensory: light touch, pinprick, and vibration intact. Romberg: negative.  Coordination: FNF and heel-shin tests intact b/l.   Gait:  Normal, able to tandem walk *** without difficulty.  DATA:   LABS/EEG/IMAGING/OTHER STUDIES: I reviewed pertinent medical records, as detailed in the history of present illness.  ASSESSMENT AND PLAN:      ASSESSMENT: Barb Marie is a 82 year old female patient with listed above past medical history, who presents with ***.    We had a detailed discussion with the patient regarding her presenting complaints.  The neurological exam today is ***.    DIAGNOSES:  No diagnosis found.  PLAN: There are no Patient Instructions on file for this visit.    Total Time: *** minutes spent on the date of the encounter doing chart review, history and exam, documentation and further activities per the note.    Jonathan FLOOD  MD Aidan  [facility]  (Chart documentation was completed in part with Dragon voice-recognition software. Even though reviewed, some grammatical, spelling, and word errors may remain.)

## 2023-02-15 ENCOUNTER — OFFICE VISIT (OUTPATIENT)
Dept: NEUROLOGY | Facility: CLINIC | Age: 83
End: 2023-02-15
Payer: MEDICARE

## 2023-02-15 VITALS — OXYGEN SATURATION: 96 % | HEART RATE: 68 BPM | DIASTOLIC BLOOD PRESSURE: 79 MMHG | SYSTOLIC BLOOD PRESSURE: 120 MMHG

## 2023-02-15 DIAGNOSIS — G62.9 PERIPHERAL POLYNEUROPATHY: ICD-10-CM

## 2023-02-15 DIAGNOSIS — R29.898 BILATERAL LEG WEAKNESS: Primary | ICD-10-CM

## 2023-02-15 DIAGNOSIS — R26.2 DIFFICULTY WALKING: ICD-10-CM

## 2023-02-15 DIAGNOSIS — R73.09 OTHER ABNORMAL GLUCOSE: ICD-10-CM

## 2023-02-15 PROCEDURE — 99205 OFFICE O/P NEW HI 60 MIN: CPT | Performed by: PSYCHIATRY & NEUROLOGY

## 2023-02-15 NOTE — PROGRESS NOTES
"Barb Marie is a 82 year old female who presents for:  Chief Complaint   Patient presents with     Consult     Muscle weakness legs bilateral, patient feels like legs will give out         Initial Vitals:  /79 (BP Location: Left arm, Patient Position: Sitting, Cuff Size: Adult Regular)   Pulse 68   SpO2 96%  Estimated body mass index is 26.68 kg/m  as calculated from the following:    Height as of 6/17/22: 1.676 m (5' 6\").    Weight as of 6/17/22: 75 kg (165 lb 4.8 oz).. There is no height or weight on file to calculate BSA. BP completed using cuff size: regular        Michelle Bass  "

## 2023-02-15 NOTE — LETTER
2/15/2023         RE: Barb Marie  [address] [place] [address]        Dear Colleague,    Thank you for referring your patient, Barb Marie, to the [facility]. Please see a copy of my visit note below.    INITIAL NEUROLOGY CONSULTATION    DATE OF VISIT: 2/15/2023  CLINIC LOCATION: [facility]  MRN: [id number]  PATIENT NAME: Barb Marie  YOB: 1940    REASON FOR VISIT:   Chief Complaint   Patient presents with     Consult     Muscle weakness legs bilateral, patient feels like legs will give out      HISTORY OF PRESENT ILLNESS:                                                    Ms. Barb Marie is 82 year old right handed female patient with past medical history of hyperlipidemia, pernicious anemia, prediabetes, and glaucoma, who was seen today for bilateral leg weakness. Came with friend/neighbor.    Per patient's report, bilateral lower extremity weakness started several years ago.  She denies any associated numbness or tingling, but admits that it affects her walking.  Leaning on the shopping cart helps.  She has a cane and a walker, but does not consistently use them.  She denies any recent falls, neck or low back pain.  Has history of vitamin B12 deficiency and was recently restarted on B12 injections.  Reports forgetfulness.  Denies any additional focal neurological symptoms.    Laboratory evaluation from March-May 2022 includes unremarkable CMP, except elevated glucose of 120, elevated hemoglobin A1C of 5.8, normal TSH of 3.62, vitamin D (30), unremarkable CBC, urinalysis positive for leukocyte esterase and nitrates with positive for E. coli urine culture, elevated LDL (121), and low vitamin B12 (198).  Hemoglobin A1C was 5.8 in March 2022.    No prior brain or spine imaging.  No nerve conduction studies.    No additional useful information is available in Care Everywhere, which was reviewed.  PAST  MEDICAL/SURGICAL HISTORY:                                                    I personally reviewed patient's past medical and surgical history with the patient at today's visit.  MEDICATIONS:                                                    I personally reviewed patient's medications and allergies with the patient at today's visit.  ALLERGIES:                                                      Allergies   Allergen Reactions     Atorvastatin GI Disturbance     Pt is allergic to Lipitor.     Hmg-Coa-R Inhibitors      jaundice     EXAM:                                                    VITAL SIGNS:   /79 (BP Location: Left arm, Patient Position: Sitting, Cuff Size: Adult Regular)   Pulse 68   SpO2 96%   Mini-Cog Assessment:  Mini Cog Assessment  Clock Draw Score: 2 Normal  3 Item Recall: 2 objects recalled  Mini Cog Total Score: 4  Administered by: : Michelle MAJOR    General: pt is in NAD, cooperative.  Skin: normal turgor, moist mucous membranes, no lesions/rashes noticed.  HEENT: ATNC, EOMI, PERRL, white sclera, normal conjunctiva, no nystagmus or ptosis. No carotid bruits bilaterally.  Respiratory: lung sounds clear to auscultation bilaterally, no crackles, wheezes, rhonchi. Symmetric lung excursion, no accessory respiratory muscle use.  Cardiovascular: normal S1/S2, no murmurs/rubs/gallops.   Abdomen: Not distended.  : deferred.    Neurological:  Mental: alert, follows commands, Mini Cog Total Score: 4/5 with 2/3 on memory recall, no aphasia or dysarthria. Fund of knowledge is slightly diminished for age.  Cranial Nerves:  CN II: visual acuity - able to accurately count fingers with each eye. Visual fields intact, fundi: discs sharp, no papilledema and normal vessels bilaterally.  CN III, IV, VI: EOM intact, pupils equal and reactive  CN V: facial sensation nl  CN VII: face symmetric, no facial droop  CN VIII: hearing normal  CN IX: palate elevation symmetric, uvula at midline  CN XI SCM normal, shoulder  mauricioug nl  CN XII: tongue midline  Motor: Strength: 5/5 in all major groups of all extremities. Normal tone. No abnormal movements. No pronator drift b/l.  Able to get up from the chair without assistance of her arms, but it takes a few attempts to do so.  Reflexes: Triceps, biceps, brachioradialis, and patellar reflexes normal and symmetric, achilles reflexes are absent. No clonus noted. Toes are down-going b/l.   Sensory: light touch, pinprick, and vibration reduced in both distal lower extremities, pinprick loss extends higher on the right leg compared to the left. Romberg: Positive.  Coordination: FNF and heel-shin tests intact b/l.   Gait: Unsteady, slightly wide-based, arm swings are preserved.  DATA:   LABS/EEG/IMAGING/OTHER STUDIES: I reviewed pertinent medical records, as detailed in the history of present illness.  ASSESSMENT AND PLAN:      ASSESSMENT: Barb Marie is a 82 year old female patient with listed above past medical history, who presents with bilateral lower extremity weakness.    We had a detailed discussion with the patient and her friend regarding her presenting complaints.  The neurological exam today is consistent with peripheral polyneuropathy.  We discussed that this condition might be related to her history of prediabetes and B12 deficiency, but would not fully explain her bilateral lower extremity weakness.  Deep tendon reflexes are not increased to consider myelopathy.      I suspect that she might have lumbar spinal stenosis that causes transient weakness when she is upright, though some of it might be explained by deconditioning.  Would like to proceed with lumbar spine MRI to evaluate it further.  We might consider cervical spine MRI if the initial testing is unrevealing.     I also ordered screening labs for reversible causes of peripheral polyneuropathy.  Meanwhile, I ordered physical therapy for bilateral lower extremity strengthening and fall prevention.    Regarding her  "forgetfulness, mini cog today is 4/5.  Part of it could be due to vitamin B12 deficiency, but we might consider doing MoCA at the next visit if memory problems persist.    DIAGNOSES:    ICD-10-CM    1. Bilateral leg weakness  R29.898 Adult Neurology  Referral     MR Lumbar Spine w/o Contrast      2. Difficulty walking  R26.2 Adult Neurology  Referral     MR Lumbar Spine w/o Contrast      3. Peripheral polyneuropathy  G62.9         PLAN: At today's visit we thoroughly discussed various diagnostic possibilities for patient's symptoms, necessary evaluation, and the plan, which includes:  Orders Placed This Encounter   Procedures     MR Lumbar Spine w/o Contrast     Methylmalonic Acid     Vitamin B12     Vitamin B1 whole blood     Vitamin B6     Vitamin E     Hemoglobin A1c     Physical Therapy Referral     No new medications.     Additional recommendations after the work-up.    Next follow-up appointment is in the next 6 weeks or earlier if needed.    Total Time: 63 minutes spent on the date of the encounter doing chart review, history and exam, documentation and further activities per the note.    Jonathan Bermudez MD  [facility]  (Chart documentation was completed in part with Dragon voice-recognition software. Even though reviewed, some grammatical, spelling, and word errors may remain.)          Barb Marie is a 82 year old female who presents for:  Chief Complaint   Patient presents with     Consult     Muscle weakness legs bilateral, patient feels like legs will give out         Initial Vitals:  /79 (BP Location: Left arm, Patient Position: Sitting, Cuff Size: Adult Regular)   Pulse 68   SpO2 96%  Estimated body mass index is 26.68 kg/m  as calculated from the following:    Height as of 6/17/22: 1.676 m (5' 6\").    Weight as of 6/17/22: 75 kg (165 lb 4.8 oz).. There is no height or weight on file to calculate BSA. BP completed using cuff size: " regular        Michelle Bass      Again, thank you for allowing me to participate in the care of your patient.        Sincerely,        Jonathan Bermudez MD

## 2023-02-15 NOTE — PROGRESS NOTES
INITIAL NEUROLOGY CONSULTATION    DATE OF VISIT: 2/15/2023  CLINIC LOCATION: [facility]  MRN: [id number]  PATIENT NAME: Barb Marie  YOB: 1940    REASON FOR VISIT:   Chief Complaint   Patient presents with     Consult     Muscle weakness legs bilateral, patient feels like legs will give out      HISTORY OF PRESENT ILLNESS:                                                    Ms. Barb Marie is 82 year old right handed female patient with past medical history of hyperlipidemia, pernicious anemia, prediabetes, and glaucoma, who was seen today for bilateral leg weakness. Came with friend/neighbor.    Per patient's report, bilateral lower extremity weakness started several years ago.  She denies any associated numbness or tingling, but admits that it affects her walking.  Leaning on the shopping cart helps.  She has a cane and a walker, but does not consistently use them.  She denies any recent falls, neck or low back pain.  Has history of vitamin B12 deficiency and was recently restarted on B12 injections.  Reports forgetfulness.  Denies any additional focal neurological symptoms.    Laboratory evaluation from March-May 2022 includes unremarkable CMP, except elevated glucose of 120, elevated hemoglobin A1C of 5.8, normal TSH of 3.62, vitamin D (30), unremarkable CBC, urinalysis positive for leukocyte esterase and nitrates with positive for E. coli urine culture, elevated LDL (121), and low vitamin B12 (198).  Hemoglobin A1C was 5.8 in March 2022.    No prior brain or spine imaging.  No nerve conduction studies.    No additional useful information is available in Care Everywhere, which was reviewed.  PAST MEDICAL/SURGICAL HISTORY:                                                    I personally reviewed patient's past medical and surgical history with the patient at today's visit.  MEDICATIONS:                                                    I personally reviewed  patient's medications and allergies with the patient at today's visit.  ALLERGIES:                                                      Allergies   Allergen Reactions     Atorvastatin GI Disturbance     Pt is allergic to Lipitor.     Hmg-Coa-R Inhibitors      jaundice     EXAM:                                                    VITAL SIGNS:   /79 (BP Location: Left arm, Patient Position: Sitting, Cuff Size: Adult Regular)   Pulse 68   SpO2 96%   Mini-Cog Assessment:  Mini Cog Assessment  Clock Draw Score: 2 Normal  3 Item Recall: 2 objects recalled  Mini Cog Total Score: 4  Administered by: : Michelle MAJOR    General: pt is in NAD, cooperative.  Skin: normal turgor, moist mucous membranes, no lesions/rashes noticed.  HEENT: ATNC, EOMI, PERRL, white sclera, normal conjunctiva, no nystagmus or ptosis. No carotid bruits bilaterally.  Respiratory: lung sounds clear to auscultation bilaterally, no crackles, wheezes, rhonchi. Symmetric lung excursion, no accessory respiratory muscle use.  Cardiovascular: normal S1/S2, no murmurs/rubs/gallops.   Abdomen: Not distended.  : deferred.    Neurological:  Mental: alert, follows commands, Mini Cog Total Score: 4/5 with 2/3 on memory recall, no aphasia or dysarthria. Fund of knowledge is slightly diminished for age.  Cranial Nerves:  CN II: visual acuity - able to accurately count fingers with each eye. Visual fields intact, fundi: discs sharp, no papilledema and normal vessels bilaterally.  CN III, IV, VI: EOM intact, pupils equal and reactive  CN V: facial sensation nl  CN VII: face symmetric, no facial droop  CN VIII: hearing normal  CN IX: palate elevation symmetric, uvula at midline  CN XI SCM normal, shoulder shrug nl  CN XII: tongue midline  Motor: Strength: 5/5 in all major groups of all extremities. Normal tone. No abnormal movements. No pronator drift b/l.  Able to get up from the chair without assistance of her arms, but it takes a few attempts to do so.  Reflexes:  Triceps, biceps, brachioradialis, and patellar reflexes normal and symmetric, achilles reflexes are absent. No clonus noted. Toes are down-going b/l.   Sensory: light touch, pinprick, and vibration reduced in both distal lower extremities, pinprick loss extends higher on the right leg compared to the left. Romberg: Positive.  Coordination: FNF and heel-shin tests intact b/l.   Gait: Unsteady, slightly wide-based, arm swings are preserved.  DATA:   LABS/EEG/IMAGING/OTHER STUDIES: I reviewed pertinent medical records, as detailed in the history of present illness.  ASSESSMENT AND PLAN:      ASSESSMENT: Barb Marie is a 82 year old female patient with listed above past medical history, who presents with bilateral lower extremity weakness.    We had a detailed discussion with the patient and her friend regarding her presenting complaints.  The neurological exam today is consistent with peripheral polyneuropathy.  We discussed that this condition might be related to her history of prediabetes and B12 deficiency, but would not fully explain her bilateral lower extremity weakness.  Deep tendon reflexes are not increased to consider myelopathy.      I suspect that she might have lumbar spinal stenosis that causes transient weakness when she is upright, though some of it might be explained by deconditioning.  Would like to proceed with lumbar spine MRI to evaluate it further.  We might consider cervical spine MRI if the initial testing is unrevealing.     I also ordered screening labs for reversible causes of peripheral polyneuropathy.  Meanwhile, I ordered physical therapy for bilateral lower extremity strengthening and fall prevention.    Regarding her forgetfulness, mini cog today is 4/5.  Part of it could be due to vitamin B12 deficiency, but we might consider doing MoCA at the next visit if memory problems persist.    DIAGNOSES:    ICD-10-CM    1. Bilateral leg weakness  R29.898 Adult Neurology   Referral     MR Lumbar Spine w/o Contrast      2. Difficulty walking  R26.2 Adult Neurology  Referral     MR Lumbar Spine w/o Contrast      3. Peripheral polyneuropathy  G62.9         PLAN: At today's visit we thoroughly discussed various diagnostic possibilities for patient's symptoms, necessary evaluation, and the plan, which includes:  Orders Placed This Encounter   Procedures     MR Lumbar Spine w/o Contrast     Methylmalonic Acid     Vitamin B12     Vitamin B1 whole blood     Vitamin B6     Vitamin E     Hemoglobin A1c     Physical Therapy Referral     No new medications.     Additional recommendations after the work-up.    Next follow-up appointment is in the next 6 weeks or earlier if needed.    Total Time: 63 minutes spent on the date of the encounter doing chart review, history and exam, documentation and further activities per the note.    Jonathan Bermudez MD  [facility]  (Chart documentation was completed in part with Dragon voice-recognition software. Even though reviewed, some grammatical, spelling, and word errors may remain.)

## 2023-02-15 NOTE — PATIENT INSTRUCTIONS
AFTER VISIT SUMMARY (AVS):    At today's visit we thoroughly discussed various diagnostic possibilities for your symptoms, necessary evaluation, and the plan, which includes:  Orders Placed This Encounter   Procedures    MR Lumbar Spine w/o Contrast    Methylmalonic Acid    Vitamin B12    Vitamin B1 whole blood    Vitamin B6    Vitamin E    Hemoglobin A1c    Physical Therapy Referral     No new medications.     Additional recommendations after the work-up.    Next follow-up appointment is in the next 6 weeks or earlier if needed.    Please do not hesitate to call me with any questions or concerns.    Thanks.

## 2023-02-20 ENCOUNTER — TELEPHONE (OUTPATIENT)
Dept: NEUROLOGY | Facility: CLINIC | Age: 83
End: 2023-02-20
Payer: MEDICARE

## 2023-02-20 NOTE — TELEPHONE ENCOUNTER
"Patient's neighbor Dinora called today (consent to communicate on file).  She stated that there was an MRI and some PT orders placed for patient Barb.  Patient states \"they want to be left alone\" and doesn't want to follow through with the MRI and PT.  Please contact Dinora as you can at [phone]  "

## 2023-02-20 NOTE — TELEPHONE ENCOUNTER
Called BIBI Carrera on file.   She said they set up all the appointments and pt said she does not want to do them.  Let her know what the testing was for, and that PT could help with strengthening which could assist with independence, but ultimately it is up to patient if she wants to complete the testing.      She will discuss with patient, and they will cancel appt's if pt does not want to do them.      She had no further questions at this time.     Pat DUGAN RN, BSN  [facility]

## 2023-11-16 ENCOUNTER — OFFICE VISIT (OUTPATIENT)
Dept: FAMILY MEDICINE | Facility: CLINIC | Age: 83
End: 2023-11-16
Payer: MEDICARE

## 2023-11-16 VITALS
DIASTOLIC BLOOD PRESSURE: 72 MMHG | SYSTOLIC BLOOD PRESSURE: 124 MMHG | TEMPERATURE: 98.5 F | WEIGHT: 153 LBS | HEART RATE: 101 BPM | BODY MASS INDEX: 24.59 KG/M2 | OXYGEN SATURATION: 94 % | RESPIRATION RATE: 16 BRPM | HEIGHT: 66 IN

## 2023-11-16 DIAGNOSIS — Z23 NEED FOR IMMUNIZATION AGAINST INFLUENZA: ICD-10-CM

## 2023-11-16 DIAGNOSIS — N18.31 CHRONIC KIDNEY DISEASE, STAGE 3A (H): Primary | ICD-10-CM

## 2023-11-16 DIAGNOSIS — R29.898 WEAKNESS OF BOTH LOWER EXTREMITIES: ICD-10-CM

## 2023-11-16 DIAGNOSIS — D51.0 PERNICIOUS ANEMIA: ICD-10-CM

## 2023-11-16 DIAGNOSIS — E78.5 HYPERLIPIDEMIA LDL GOAL <130: ICD-10-CM

## 2023-11-16 DIAGNOSIS — G62.9 POLYNEUROPATHY: ICD-10-CM

## 2023-11-16 DIAGNOSIS — M62.81 GENERALIZED MUSCLE WEAKNESS: ICD-10-CM

## 2023-11-16 DIAGNOSIS — Z00.00 ENCOUNTER FOR MEDICARE ANNUAL WELLNESS EXAM: ICD-10-CM

## 2023-11-16 PROCEDURE — G0008 ADMIN INFLUENZA VIRUS VAC: HCPCS | Performed by: FAMILY MEDICINE

## 2023-11-16 PROCEDURE — G0439 PPPS, SUBSEQ VISIT: HCPCS | Performed by: FAMILY MEDICINE

## 2023-11-16 PROCEDURE — 99214 OFFICE O/P EST MOD 30 MIN: CPT | Mod: 25 | Performed by: FAMILY MEDICINE

## 2023-11-16 PROCEDURE — 90662 IIV NO PRSV INCREASED AG IM: CPT | Performed by: FAMILY MEDICINE

## 2023-11-16 RX ORDER — NEEDLES, SAFETY 22GX1 1/2"
1 NEEDLE, DISPOSABLE MISCELLANEOUS
Qty: 12 EACH | Refills: 1 | Status: SHIPPED | OUTPATIENT
Start: 2023-11-16

## 2023-11-16 RX ORDER — CYANOCOBALAMIN 1000 UG/ML
1000 INJECTION, SOLUTION INTRAMUSCULAR; SUBCUTANEOUS
Qty: 3 ML | Refills: 3 | Status: SHIPPED | OUTPATIENT
Start: 2023-11-16 | End: 2024-07-16

## 2023-11-16 ASSESSMENT — ENCOUNTER SYMPTOMS
ABDOMINAL PAIN: 0
HEARTBURN: 0
PALPITATIONS: 0
HEMATOCHEZIA: 0
DIZZINESS: 0
EYE PAIN: 0
COUGH: 0
HEADACHES: 0
SORE THROAT: 0
CONSTIPATION: 0
NAUSEA: 0
ARTHRALGIAS: 0
HEMATURIA: 0
FREQUENCY: 0
PARESTHESIAS: 0
WEAKNESS: 0
MYALGIAS: 0
DYSURIA: 0
JOINT SWELLING: 0
BREAST MASS: 0
DIARRHEA: 0
CHILLS: 0
FEVER: 0
SHORTNESS OF BREATH: 0
NERVOUS/ANXIOUS: 0

## 2023-11-16 ASSESSMENT — ACTIVITIES OF DAILY LIVING (ADL)
CURRENT_FUNCTION: TRANSPORTATION REQUIRES ASSISTANCE
CURRENT_FUNCTION: MONEY MANAGEMENT REQUIRES ASSISTANCE
CURRENT_FUNCTION: SHOPPING REQUIRES ASSISTANCE
CURRENT_FUNCTION: HOUSEWORK REQUIRES ASSISTANCE

## 2023-11-16 NOTE — PATIENT INSTRUCTIONS
" assessment and training  DRIVING CAN BE A MEANS TO YOUR INDEPENDENCE, MOBILITY AND SENSE OF CONTROL.  Your ability to drive may be affected by visual, cognitive, physical or medical challenges, developmental or physical disability, changes due to aging, or mental health issues. Whether you are a new or an experienced , a driving assessment and training may help you remain independent on the road.    To schedule an appointment at any of our locations please call [phone], or if you would like more information about the service you can email us at [email or web address].  Patient Education   Personalized Prevention Plan  You are due for the preventive services outlined below.  Your care team is available to assist you in scheduling these services.  If you have already completed any of these items, please share that information with your care team to update in your medical record.  Health Maintenance Due   Topic Date Due     Pneumococcal Vaccine (1 - PCV) Never done     Diptheria Tetanus Pertussis (DTAP/TDAP/TD) Vaccine (1 - Tdap) Never done     Zoster (Shingles) Vaccine (1 of 2) Never done     RSV VACCINE (Pregnancy & 60+) (1 - 1-dose 60+ series) Never done     Comprehensive Metabolic Panel  03/07/2023     Thyroid Function Lab  03/07/2023     Complete Blood Count  03/07/2023     Vitamin D Level  03/07/2023     Cholesterol Lab  05/09/2023     Kidney Microalbumin Urine Test  05/09/2023     Vitamin B12 Level  05/09/2023     Flu Vaccine (1) 09/01/2023     COVID-19 Vaccine (4 - 2023-24 season) 09/01/2023     Learning About Being Physically Active  What is physical activity?     Being physically active means doing any kind of activity that gets your body moving.  The types of physical activity that can help you get fit and stay healthy include:  Aerobic or \"cardio\" activities. These make your heart beat faster and make you breathe harder, such as brisk walking, riding a bike, or running. They " "strengthen your heart and lungs and build up your endurance.  Strength training activities. These make your muscles work against, or \"resist,\" something. Examples include lifting weights or doing push-ups. These activities help tone and strengthen your muscles and bones.  Stretches. These let you move your joints and muscles through their full range of motion. Stretching helps you be more flexible.  Reaching a balance between these three types of physical activity is important because each one contributes to your overall fitness.  What are the benefits of being active?  Being active is one of the best things you can do for your health. It helps you to:  Feel stronger and have more energy to do all the things you like to do.  Focus better at school or work.  Feel, think, and sleep better.  Reach and stay at a healthy weight.  Lose fat and build lean muscle.  Lower your risk for serious health problems, including diabetes, heart attack, high blood pressure, and some cancers.  Keep your heart, lungs, bones, muscles, and joints strong and healthy.  How can you make being active part of your life?  Start slowly. Make it your long-term goal to get at least 30 minutes of exercise on most days of the week. Walking is a good choice. You also may want to do other activities, such as running, swimming, cycling, or playing tennis or team sports.  Pick activities that you like--ones that make your heart beat faster, your muscles stronger, and your muscles and joints more flexible. If you find more than one thing you like doing, do them all. You don't have to do the same thing every day.  Get your heart pumping every day. Any activity that makes your heart beat faster and keeps it at that rate for a while counts.  Here are some great ways to get your heart beating faster:  Go for a brisk walk, run, or hike.  Go for a swim or bike ride.  Take an online exercise class or dance.  Play a game of touch football, basketball, or " "soccer.  Play tennis, pickleball, or racquetball.  Climb stairs.  Even some household chores can be aerobic. Just do them at a faster pace. Raking or mowing the lawn, sweeping the garage, and vacuuming and cleaning your home all can help get your heart rate up.  Strengthen your muscles during the week. You don't have to lift heavy weights or grow big, bulky muscles to get stronger. Doing a few simple activities that make your muscles work against, or \"resist,\" something can help you get stronger. Aim for at least twice a week.  For example, you can:  Do push-ups or sit-ups, which use your own body weight as resistance.  Lift weights or dumbbells or use stretch bands at home or in a gym or community center.  Stretch your muscles often. Stretching will help you as you become more active. It can help you stay flexible and loosen tight muscles. It can also help improve your balance and posture and can be a great way to relax.  Be sure to stretch the muscles you'll be using when you work out. It's best to warm your muscles slightly before you stretch them. Walk or do some other light aerobic activity for a few minutes. Then start stretching.  When you stretch your muscles:  Do it slowly. Stretching is not about going fast or making sudden movements.  Don't push or bounce during a stretch.  Hold each stretch for at least 15 to 30 seconds, if you can. You should feel a stretch in the muscle, but not pain.  Breathe out as you do the stretch. Then breathe in as you hold the stretch. Don't hold your breath.  If you're worried about how more activity might affect your health, have a checkup before you start. Follow any special advice your doctor gives you for getting a smart start.  Where can you learn more?  Go to https://www.healthwise.net/patiented  Enter W332 in the search box to learn more about \"Learning About Being Physically Active.\"  Current as of: June 6, 2023               Content Version: 13.8    2915-3980 " [employer].   Care instructions adapted under license by your healthcare professional. If you have questions about a medical condition or this instruction, always ask your healthcare professional. [employer] disclaims any warranty or liability for your use of this information.      Learning About Dietary Guidelines  What are the Dietary Guidelines for Americans?     Dietary Guidelines for Americans provide tips for eating well and staying healthy. This helps reduce the risk for long-term (chronic) diseases.  These guidelines recommend that you:  Eat and drink the right amount for you. The U.[place] government's food guide is called MyPlate. It can help you make your own well-balanced eating plan.  Try to balance your eating with your activity. This helps you stay at a healthy weight.  Drink alcohol in moderation, if at all.  Limit foods high in salt, saturated fat, trans fat, and added sugar.  These guidelines are from the U.S. Department of Agriculture and the U.S. Department of Health and Human Services. They are updated every 5 years.  What is MyPlate?  MyPlate is the U.[place]. government's food guide. It can help you make your own well-balanced eating plan. A balanced eating plan means that you eat enough, but not too much, and that your food gives you the nutrients you need to stay healthy.  MyPlate focuses on eating plenty of whole grains, fruits, and vegetables, and on limiting fat and sugar. It is available online at www.ChooseMyPlate.gov.  How can you get started?  If you're trying to eat healthier, you can slowly change your eating habits over time. You don't have to make big changes all at once. Start by adding one or two healthy foods to your meals each day.  Grains  Choose whole-grain breads and cereals and whole-wheat pasta and whole-grain crackers.  Vegetables  Eat a variety of vegetables every day. They have lots of nutrients and are part of a heart-healthy diet.  Fruits  Eat a  "variety of fruits every day. Fruits contain lots of nutrients. Choose fresh fruit instead of fruit juice.  Protein foods  Choose fish and lean poultry more often. Eat red meat and fried meats less often. Dried beans, tofu, and nuts are also good sources of protein.  Dairy  Choose low-fat or fat-free products from this food group. If you have problems digesting milk, try eating cheese or yogurt instead.  Fats and oils  Limit fats and oils if you're trying to cut calories. Choose healthy fats when you cook. These include canola oil and olive oil.  Where can you learn more?  Go to https://www.[employer].net/patiented  Enter D676 in the search box to learn more about \"Learning About Dietary Guidelines.\"  Current as of: February 28, 2023               Content Version: 13.8    3694-9464 [employer].   Care instructions adapted under license by your healthcare professional. If you have questions about a medical condition or this instruction, always ask your healthcare professional. [employer] disclaims any warranty or liability for your use of this information.      Activities of Daily Living    Your Health Risk Assessment indicates you have difficulties with activities of daily living such as housework, bathing, preparing meals, taking medication, etc. Please make a follow up appointment for us to address this issue in more detail.  Hearing Loss: Care Instructions  Overview     Hearing loss is a sudden or slow decrease in how well you hear. It can range from slight to profound. Permanent hearing loss can occur with aging. It also can happen when you are exposed long-term to loud noise. Examples include listening to loud music, riding motorcycles, or being around other loud machines.  Hearing loss can affect your work and home life. It can make you feel lonely or depressed. You may feel that you have lost your independence. But hearing aids and other devices can help you hear better and feel " connected to others.  Follow-up care is a key part of your treatment and safety. Be sure to make and go to all appointments, and call your doctor if you are having problems. It's also a good idea to know your test results and keep a list of the medicines you take.  How can you care for yourself at home?  Avoid loud noises whenever possible. This helps keep your hearing from getting worse.  Always wear hearing protection around loud noises.  Wear a hearing aid as directed.  A professional can help you pick a hearing aid that will work best for you.  You can also get hearing aids over the counter for mild to moderate hearing loss.  Have hearing tests as your doctor suggests. They can show whether your hearing has changed. Your hearing aid may need to be adjusted.  Use other devices as needed. These may include:  Telephone amplifiers and hearing aids that can connect to a television, stereo, radio, or microphone.  Devices that use lights or vibrations. These alert you to the doorbell, a ringing telephone, or a baby monitor.  Television closed-captioning. This shows the words at the bottom of the screen. Most new TVs can do this.  TTY (text telephone). This lets you type messages back and forth on the telephone instead of talking or listening. These devices are also called TDD. When messages are typed on the keyboard, they are sent over the phone line to a receiving TTY. The message is shown on a monitor.  Use text messaging, social media, and email if it is hard for you to communicate by telephone.  Try to learn a listening technique called speechreading. It is not lipreading. You pay attention to people's gestures, expressions, posture, and tone of voice. These clues can help you understand what a person is saying. Face the person you are talking to, and have them face you. Make sure the lighting is good. You need to see the other person's face clearly.  Think about counseling if you need help to adjust to your  "hearing loss.  When should you call for help?  Watch closely for changes in your health, and be sure to contact your doctor if:    You think your hearing is getting worse.     You have new symptoms, such as dizziness or nausea.   Where can you learn more?  Go to https://www.[employer].net/patiented  Enter R798 in the search box to learn more about \"Hearing Loss: Care Instructions.\"  Current as of: February 28, 2023               Content Version: 13.8    4196-5941 [employer].   Care instructions adapted under license by your healthcare professional. If you have questions about a medical condition or this instruction, always ask your healthcare professional. [employer] disclaims any warranty or liability for your use of this information.         "

## 2023-11-16 NOTE — PROGRESS NOTES
"SUBJECTIVE:   Barb is a 83 year old, presenting for the following:  Physical        11/16/2023     1:42 PM   Additional Questions   Roomed by Palak REA CMA       Are you in the first 12 months of your Medicare coverage?  No    Healthy Habits:     In general, how would you rate your overall health?  Good    Frequency of exercise:  None    Do you usually eat at least 4 servings of fruit and vegetables a day, include whole grains    & fiber and avoid regularly eating high fat or \"junk\" foods?  No    Taking medications regularly:  Yes    Medication side effects:  Not applicable    Ability to successfully perform activities of daily living:  Transportation requires assistance, shopping requires assistance, housework requires assistance and money management requires assistance    Home Safety:  No safety concerns identified    Hearing Impairment:  Difficulty following a conversation in a noisy restaurant or crowded room, feel that people are mumbling or not speaking clearly, difficulty following dialogue in the theater, difficult to understand a speaker at a public meeting or [employer] service, need to ask people to speak up or repeat themselves, find that men's voices are easier to understand than woman's and difficulty understanding soft or whispered speech    In the past 6 months, have you been bothered by leaking of urine?  No    In general, how would you rate your overall mental or emotional health?  Excellent    Additional concerns today:  Yes      Have you ever done Advance Care Planning? (For example, a Health Directive, POLST, or a discussion with a medical provider or your loved ones about your wishes): Yes, patient states has an Advance Care Planning document and will bring a copy to the clinic.       Fall risk completed No, NO  Fallen 2 or more times in the past year?: No  Any fall with injury in the past year?: No  Cognitive Screening   1) Repeat 3 items (Leader, Season, Table)    2) Clock draw: NORMAL  3) 3 " item recall: Recalls 1 object   Results: NORMAL clock, 1-2 items recalled: COGNITIVE IMPAIRMENT LESS LIKELY    Mini-CogTM Copyright ARIK Robertson. Licensed by the author for use in [facility]; reprinted with permission (balbina@[email or web address]). All rights reserved.      Do you have sleep apnea, excessive snoring or daytime drowsiness? : no    Reviewed and updated as needed this visit by clinical staff    Allergies  Meds              Reviewed and updated as needed this visit by Provider                 Social History     Tobacco Use     Smoking status: Never     Smokeless tobacco: Never   Substance Use Topics     Alcohol use: No             11/16/2023     1:53 PM   Alcohol Use   Prescreen: >3 drinks/day or >7 drinks/week? No     Do you have a current opioid prescription? No  Do you use any other controlled substances or medications that are not prescribed by a provider? None      Current providers sharing in care for this patient include:   Patient Care Team:  Reba Martino APRN CNP as PCP - General (Nurse Practitioner - Family)  Reba Martino APRN CNP as Assigned PCP  Ravi Flores MD as MD (Cardiovascular Disease)  Ravi Flores MD as MD (Cardiovascular Disease)  Ravi Flores MD as Assigned Heart and Vascular Provider  Jonathan Bermudez MD as Assigned Neuroscience Provider    The following health maintenance items are reviewed in Epic and correct as of today:  Health Maintenance   Topic Date Due     Pneumococcal Vaccine: 65+ Years (1 - PCV) Never done     DTAP/TDAP/TD IMMUNIZATION (1 - Tdap) Never done     ZOSTER IMMUNIZATION (1 of 2) Never done     RSV VACCINE (Pregnancy & 60+) (1 - 1-dose 60+ series) Never done     MEDICARE ANNUAL WELLNESS VISIT  06/14/2020     CMP  03/07/2023     TSH W/FREE T4 REFLEX  03/07/2023     CBC  03/07/2023     VITAMIN D  03/07/2023     LIPID  05/09/2023     MICROALBUMIN  05/09/2023     ANNUAL REVIEW OF HM ORDERS  05/09/2023      "VITAMIN B12  05/09/2023     INFLUENZA VACCINE (1) 09/01/2023     COVID-19 Vaccine (4 - 2023-24 season) 09/01/2023     FALL RISK ASSESSMENT  11/16/2024     ADVANCE CARE PLANNING  11/16/2028     PHQ-2 (once per calendar year)  Completed     URINALYSIS  Completed     IPV IMMUNIZATION  Aged Out     HPV IMMUNIZATION  Aged Out     MENINGITIS IMMUNIZATION  Aged Out     RSV MONOCLONAL ANTIBODY  Aged Out     BMP  Discontinued     HEMOGLOBIN  Discontinued       Pertinent mammograms are reviewed under the imaging tab.    Review of Systems   Constitutional:  Negative for chills and fever.   HENT:  Negative for congestion, ear pain, hearing loss and sore throat.    Eyes:  Negative for pain and visual disturbance.   Respiratory:  Negative for cough and shortness of breath.    Cardiovascular:  Negative for chest pain, palpitations and peripheral edema.   Gastrointestinal:  Negative for abdominal pain, constipation, diarrhea, heartburn, hematochezia and nausea.   Breasts:  Negative for tenderness, breast mass and discharge.   Genitourinary:  Negative for dysuria, frequency, genital sores, hematuria, pelvic pain, urgency, vaginal bleeding and vaginal discharge.   Musculoskeletal:  Negative for arthralgias, joint swelling and myalgias.   Skin:  Negative for rash.   Neurological:  Negative for dizziness, weakness, headaches and paresthesias.   Psychiatric/Behavioral:  Negative for mood changes. The patient is not nervous/anxious.      OBJECTIVE:   /72   Pulse 101   Temp 98.5  F (36.9  C) (Tympanic)   Resp 16   Ht 1.676 m (5' 6\")   Wt 69.4 kg (153 lb)   SpO2 94%   BMI 24.69 kg/m   Estimated body mass index is 24.69 kg/m  as calculated from the following:    Height as of this encounter: 1.676 m (5' 6\").    Weight as of this encounter: 69.4 kg (153 lb).  Physical Exam  GENERAL: healthy, alert and no distress  EYES: Eyes grossly normal to inspection  NECK: no adenopathy and no asymmetry, masses, or scars  RESP: lungs clear to " "auscultation - no rales, rhonchi or wheezes  CV: regular rates and rhythm, normal S1 S2, no S3 or S4, and no murmur, click or rub  MS: no gross musculoskeletal defects noted, no edema  SKIN: no suspicious lesions or rashes  PSYCH: mentation appears normal, affect normal/bright        ASSESSMENT / PLAN:   1. Encounter for Medicare annual wellness exam  Health maintenance reviewed and updated. Emphasized importance of balanced diet and regular exercise.      2. Chronic kidney disease, stage 3a (H)  Recheck kidney function  - COMPREHENSIVE METABOLIC PANEL; Future  - Albumin Random Urine Quantitative with Creat Ratio; Future    3. Pernicious anemia  Recheck B12, CBC. Continue B12 supplements.  - CBC with Platelets; Future  - Syringe/Needle, Disp, (B-D SYRINGE/NEEDLE) 25G X 5/8\" 3 ML MISC; 1 Syringe every 30 days Vitamin B12 monthly  Dispense: 12 each; Refill: 1    4. Hyperlipidemia LDL goal <130  Recheck lipids.  - Lipid panel reflex to direct LDL Non-fasting; Future    5. Weakness of both lower extremities  Refer for home PT  - Home Care Referral    6. Generalized muscle weakness  As above  - Home Care Referral    7. Polyneuropathy  Check labs home care referral  - TSH WITH FREE T4 REFLEX; Future  - Home Care Referral    8. Need for immunization against influenza  - INFLUENZA VACCINE 65+ (FLUZONE HD)          COUNSELING:  Reviewed preventive health counseling, as reflected in patient instructions        She reports that she has never smoked. She has never used smokeless tobacco.      Appropriate preventive services were discussed with this patient, including applicable screening as appropriate for fall prevention, nutrition, physical activity, Tobacco-use cessation, weight loss and cognition.  Checklist reviewing preventive services available has been given to the patient.    Reviewed patients plan of care and provided an AVS. The Basic Care Plan (routine screening as documented in Health Maintenance) for Barb meets " the Care Plan requirement. This Care Plan has been established and reviewed with the Patient.          Willie Soto DO  [facility] [place]    Identified Health Risks:  I have reviewed Opioid Use Disorder and Substance Use Disorder risk factors and made any needed referrals. She is at risk for lack of exercise and has been provided with information to increase physical activity for the benefit of her well-being.  The patient was counseled and encouraged to consider modifying their diet and eating habits. She was provided with information on recommended healthy diet options.  The patient reports that she has difficulty with activities of daily living.

## 2023-11-17 ENCOUNTER — TELEPHONE (OUTPATIENT)
Dept: FAMILY MEDICINE | Facility: CLINIC | Age: 83
End: 2023-11-17
Payer: MEDICARE

## 2023-11-17 ENCOUNTER — DOCUMENTATION ONLY (OUTPATIENT)
Dept: OTHER | Facility: CLINIC | Age: 83
End: 2023-11-17
Payer: MEDICARE

## 2023-11-17 NOTE — TELEPHONE ENCOUNTER
Reason for Call:  Other returning call    Detailed comments: Sent a Referral for Home Health and they can not see her until the 20th or after of November and is wondering if that is ok?    Phone Number Patient can be reached at: Other phone number:  Anna [phone]    Best Time: Anytime    Can we leave a detailed message on this number? YES    Call taken on 11/17/2023 at 7:57 AM by Debra Peralta

## 2023-11-20 ENCOUNTER — TELEPHONE (OUTPATIENT)
Dept: FAMILY MEDICINE | Facility: CLINIC | Age: 83
End: 2023-11-20
Payer: MEDICARE

## 2023-11-20 NOTE — TELEPHONE ENCOUNTER
Idalmis nurse from [facility] requesting delay of start orders.       Home Care is calling regarding an established patient with [facility].       Requesting orders from: Reba Martino  Provider is following patient: Yes  Is this a 60-day recertification request?  No    Orders Requested    Skilled Nursing  Request for delay in care, service is not able to be provided within same scheduled day.     Delay in start of care until 11/27/23 per patient request.        Confirmed ok to leave a detailed message with call back.  Contact information confirmed and updated as needed.  Advised will send to provider for order and call her back.

## 2023-11-27 ENCOUNTER — MEDICAL CORRESPONDENCE (OUTPATIENT)
Dept: HEALTH INFORMATION MANAGEMENT | Facility: CLINIC | Age: 83
End: 2023-11-27

## 2023-11-28 ENCOUNTER — TELEPHONE (OUTPATIENT)
Dept: SCHEDULING | Facility: CLINIC | Age: 83
End: 2023-11-28
Payer: MEDICARE

## 2023-11-28 NOTE — TELEPHONE ENCOUNTER
General Call    Contacts         Type Contact Phone/Fax    11/28/2023 12:30 PM CST Phone (Incoming) María Loja (Emergency Contact) [phone]          Reason for Call:  Speak to provider  What are your questions or concerns:  Pt's niece María is calling to request a call back in regards to pt's visit with provider on 11/16. Stating it is pertaining to pt's ability to drive, that they have received a letter from the DMV notifying them of pt's license being revoked as of 12/2. Would like to speak with provider about this and what to tell pt moving forward.    Date of last appointment with provider: 11/16    Okay to leave a detailed message?: Yes at Cell number on file:    Telephone Information:   Mobile [phone]

## 2023-11-28 NOTE — TELEPHONE ENCOUNTER
Not sure what this is about?   I have not seen her since 5/2022.            Healthy regards,            Reba aMrtino, FNP-BC

## 2023-11-29 ENCOUNTER — TELEPHONE (OUTPATIENT)
Dept: FAMILY MEDICINE | Facility: CLINIC | Age: 83
End: 2023-11-29
Payer: MEDICARE

## 2023-11-29 NOTE — TELEPHONE ENCOUNTER
At her visit, handicap parking permit was completed. During our discussions, there was concerns about driving safety and recommendation was to complete a  assessment through [facility]. This recommendation was included on the permit application as I can't assess her ability to drive on my own. Information to schedule this was provided to Barb and her niece who accompanied her.    Willie Soto,   11/29/2023 12:08 AM

## 2023-11-29 NOTE — TELEPHONE ENCOUNTER
Elder a physical therapist from [facility] requesting orders.         Home Care is calling regarding an established patient with [facility].       Requesting orders from:  Dr. Soto.   Provider is following patient: Yes  Is this a 60-day recertification request?  No    Orders Requested    Physical Therapy  Request for initial certification (first set of orders)   Frequency:  5x/wk for over 8 wks      Confirmed ok to leave a detailed message with call back.  Contact information confirmed and updated as needed.    Brionna Avila RN

## 2023-11-29 NOTE — TELEPHONE ENCOUNTER
Called María and explained info     11/16/23 office note      assessment and training  DRIVING CAN BE A MEANS TO YOUR INDEPENDENCE, MOBILITY AND SENSE OF CONTROL.  Your ability to drive may be affected by visual, cognitive, physical or medical challenges, developmental or physical disability, changes due to aging, or mental health issues. Whether you are a new or an experienced , a driving assessment and training may help you remain independent on the road.     To schedule an appointment at any of our locations please call [phone], or if you would like more information about the service you can email us at [email or web address].        Her niece would like to know if Dr. Soto sent in the paperwork for her license to be revoked? The niece feels if the patient knows who and why her license was revoked she will better understand.     Advised note states cognitive concerns.    The niece is asking if they should we even complete the eval, niece states the patient will fail.     Advised assessment is the only way to know wether she will pass or fail.   Also dicussed  sometimes going through the assessment process and then someone else telling them why they can not drive, if that is the outcome,  will help the patient understand.

## 2023-11-30 NOTE — TELEPHONE ENCOUNTER
They likely set up time frame for license to be revoked because I was not able to say that she is safe to drive independently and indicated a recommendation to complete a driving evaluation.     If Barb would like to keep driving, I would advise she complete the driving evaluation. If she doesn't think she'd pass, then it is probably a good idea that she ceases driving for her own and others' safety anyway.    Willie Soto,   11/30/2023 7:21 AM

## 2023-12-04 NOTE — TELEPHONE ENCOUNTER
Called #   Telephone Information:   Mobile [phone]     Advised pts niece on the information below     Patient stated an understanding and agreed with plan.      Beverly England RN, BSN  [facility]

## 2023-12-29 ENCOUNTER — TELEPHONE (OUTPATIENT)
Dept: FAMILY MEDICINE | Facility: CLINIC | Age: 83
End: 2023-12-29
Payer: MEDICARE

## 2024-01-02 NOTE — TELEPHONE ENCOUNTER
Faxed signed forms to [facility] #    Order #[id number]    Copied into HIMS // Filed in [facility] // Gisela   
For Dr. Soto.      Healthy regards,            Reba Martino, FNP-BC  
Home Health Care      Reason for call:  [facility] Certfication Plan of care - Order #[id number]    Therapy requested    Pt Provider: Dr. Soto    Phone Number Homecare Nurse can be reached at: fax       Okay to leave a detailed message?:  NA    Put in providers in box  Included letter and med rec    Gisela K    
Patient's form signed, dated, and placed in TC basket.    Willie Soto DO  12/29/2023 4:11 PM    
Never smoker

## 2024-01-10 NOTE — TELEPHONE ENCOUNTER
Order # [id number]  with [facility] has already been faxed back to [facility] - HOWEVER I just received a duplicate copy of this order that was completed AGAIN?    I FAXED A 2ND TIME.to     Gisela GOSS

## 2024-01-31 ENCOUNTER — MEDICAL CORRESPONDENCE (OUTPATIENT)
Dept: HEALTH INFORMATION MANAGEMENT | Facility: CLINIC | Age: 84
End: 2024-01-31

## 2024-02-01 ENCOUNTER — TELEPHONE (OUTPATIENT)
Dept: FAMILY MEDICINE | Facility: CLINIC | Age: 84
End: 2024-02-01
Payer: COMMERCIAL

## 2024-02-01 NOTE — TELEPHONE ENCOUNTER
Home Care is calling regarding an established patient with [facility].       Requesting orders from:  Dr. Soto  Provider is following patient: Yes  Is this a 60-day recertification request?  No    Orders Requested    Physical Therapy  Request for initial certification (first set of orders)   Frequency: 6 visits over 8 weeks     Information was gathered and will be sent to provider for review.  RN will contact Home Care with information after provider review.  Confirmed ok to leave a detailed message with call back.  Contact information confirmed and updated as needed.    CLAUDIA SOL RN    [phone]

## 2024-02-02 NOTE — TELEPHONE ENCOUNTER
Called Elder back, gave ok for PT orders.     CLAUDIA SOL RN on 2/2/2024 at 9:04 AM   [facility]

## 2024-02-23 ENCOUNTER — TELEPHONE (OUTPATIENT)
Dept: FAMILY MEDICINE | Facility: CLINIC | Age: 84
End: 2024-02-23
Payer: COMMERCIAL

## 2024-02-23 NOTE — TELEPHONE ENCOUNTER
Home Health Care      Reason for call:  [facility] - Order #[id number] - Cert Period: 01/31/24  -  03/30/24 -   Orders are needed for this patient.  PT 1wk3 [id number], 1wk2, [id number]    Pt Provider: ESTEVAN Martino    Phone Number Homecare Nurse can be reached at: [phone]      Could we send this information to you in [employer] or would you prefer to receive a phone call?:   na    Put in providers in box with med rec and letter    Gisela GOSS

## 2024-02-26 NOTE — TELEPHONE ENCOUNTER
Paperwork signed placed in [facility] to be faxed.      Healthy regards,            Reba Martino, FNP-BC

## 2024-02-26 NOTE — TELEPHONE ENCOUNTER
Faxed signed forms to [facility] - Order #[id number]    Cert Period: 01/31/24 - 03/30/24    PT 1wk3, [id number], 1wk2, [id number]    [facility] Filed in [facility] / Gisela GOSS

## 2024-02-27 NOTE — TELEPHONE ENCOUNTER
[facility] called stating that they had sent the forms signed by Reba Martino to the name of Dr. Willie Soto.  They were wondering who this patients primary provider was, and they have seen three here in the last couple months.  Reba Martino, was the last to see this pt (three times) and so they want to resent (exact forms that were already signed by provider) in Reba Martino's name.  I put the forms in providers in box with a [address]ATE ON [employer] [address] note.      Gisela GOSS

## 2024-02-27 NOTE — TELEPHONE ENCOUNTER
Forms rec from [facility] as stated in Encounter.  Put them papercliped together in providers in basket to resign.     Gisela GOSS

## 2024-03-01 NOTE — TELEPHONE ENCOUNTER
This is actually all sorted out now that I reviewed the chart thoroughly.  I am listed as her PCP however I have not physically seen her since 5/2022.  She actually had her wellness examination with Dr. Soto and he ordered home care.  He is the one to be sent and sign these orders.    They were originally sent to me in my basket but under his name which is what the problem.    Please call back facility have them send the paperwork back in Dr. Soto's name and place in his basket.         Healthy regards,            Reba Martino, FNP-BC

## 2024-03-06 DIAGNOSIS — Z53.9 DIAGNOSIS NOT YET DEFINED: Primary | ICD-10-CM

## 2024-03-06 PROCEDURE — G0180 MD CERTIFICATION HHA PATIENT: HCPCS | Performed by: NURSE PRACTITIONER

## 2024-03-06 NOTE — TELEPHONE ENCOUNTER
Form completed for provider ARIK Soto by ÁLVARO Hale  Faxed back to [phone]  Sent to [facility]  And filed in [facility].

## 2024-03-07 ENCOUNTER — TELEPHONE (OUTPATIENT)
Dept: FAMILY MEDICINE | Facility: CLINIC | Age: 84
End: 2024-03-07
Payer: COMMERCIAL

## 2024-03-07 DIAGNOSIS — M62.81 GENERALIZED MUSCLE WEAKNESS: ICD-10-CM

## 2024-03-07 DIAGNOSIS — R29.898 WEAKNESS OF BOTH LOWER EXTREMITIES: Primary | ICD-10-CM

## 2024-03-07 NOTE — TELEPHONE ENCOUNTER
María is requesting home OT orders-  ADLS and safety in the home.     Berry wants OT eval to help support patient's understanding that she may need to move to assisted living.     Patient has PT already set up for next Thursday.       berry would like a call back [phone]

## 2024-03-08 NOTE — TELEPHONE ENCOUNTER
Berry Daniel calling back stating OT called to schedule in office visit. She wanted the orders for in home. Sure would like a call back [phone].

## 2024-03-08 NOTE — TELEPHONE ENCOUNTER
Writer spoke with [facility] to inquire if current occupational therapy order can be faxed to them, and if [facility] can start home care for this. Writer faxed current order to [phone], emily Sharif and Julius.     Placed call to patient's niece to advise occupational therapy order was faxed to [facility], to see if they can start Occupational therapy.

## 2024-03-11 NOTE — TELEPHONE ENCOUNTER
Rv triage or south. Please look into this. Are orders to be signed by Dr. Soto who ordered them?    I am PCP but have not seen patient for some time. I think the delay are confusion with the orders is not being sent to correct provider?               Reba Martino, FNP-BC

## 2024-03-11 NOTE — TELEPHONE ENCOUNTER
Appts have been made and  María (niece) confirmed that they are waiting to get her in.  She needed a new order due to the other one being (out of home) and they requested (in home.)  All good.      Closed encounter.    Gisela GOSS

## 2024-03-14 NOTE — TELEPHONE ENCOUNTER
Sunni from [facility] is calling about form order #[id number]. Forms will need to be in Dr. Soto name due to being the most recent provider patient has seen. Sunni will resend forms with Dr. Soto name on it and will need Dr. Soto to sign - unable to take signed form with sig and name not matching. Awaiting forms.

## 2024-03-19 ENCOUNTER — TELEPHONE (OUTPATIENT)
Dept: FAMILY MEDICINE | Facility: CLINIC | Age: 84
End: 2024-03-19
Payer: COMMERCIAL

## 2024-03-19 NOTE — TELEPHONE ENCOUNTER
NO FORM - VERBAL ORDERS ONLY    Order/Referral Request    Who is requesting: [facility] calling for VERBAL ORDERS    Orders being requested: OT - 1wk1 - re certification next week.  Will come in paper form    Reason service is needed/diagnosis: Home Safety    When are orders needed by: asap    Has this been discussed with Provider: No    Does patient have a preference on a Group/Provider/Facility? above    Does patient have an appointment scheduled?: No    Where to send orders: secure line #[phone]    Could we send this information to you in [employer] or would you prefer to receive a phone call?:   lizett GOSS

## 2024-03-20 NOTE — TELEPHONE ENCOUNTER
Pina called back and said please route the orders to Dr Soto and she will also change it in her system going forward.

## 2024-03-20 NOTE — TELEPHONE ENCOUNTER
LVM on Eden's phone asking about below.     Dinesh Suazo RN [facility]

## 2024-03-20 NOTE — TELEPHONE ENCOUNTER
Please verify they are okay with me ordering.   Okay to have her orders with me but they had previous ordered from Dr. Soto.  I haven't seen her in almost 2 years and he did her physical. So they didn't want me signing orders.    Route to him please is he has to order.     Healthy regards,            Reba Martino, FNP-BC

## 2024-03-20 NOTE — TELEPHONE ENCOUNTER
Haven't seen any new forms from [facility] #[id number]  As of 3/20 no new fax.    As signed by different provider, need Dr Soto to sign the form.

## 2024-03-21 NOTE — TELEPHONE ENCOUNTER
Pina called back to check on the homecare  orders  She states the appointment with the patient is early next week, needs the orders asap.

## 2024-03-22 ENCOUNTER — TELEPHONE (OUTPATIENT)
Dept: FAMILY MEDICINE | Facility: CLINIC | Age: 84
End: 2024-03-22
Payer: COMMERCIAL

## 2024-03-22 NOTE — TELEPHONE ENCOUNTER
Home Health Care      Reason for call:  [facility] Care - Client Coordination Note Report - Fpcus of Care, Discipline OT, Specific Treatment Orders 1wk1    Orders are needed for this patient.  OT: Discipline    Pt Provider: Charles    Phone Number Homecare Nurse can be reached at: Fax       Could we send this information to you in [employer] or would you prefer to receive a phone call?:   na    Put in providers in box    Gisela K

## 2024-03-22 NOTE — TELEPHONE ENCOUNTER
Left detailed message on Eden's confidential voicemail giving Ok for OT verbal orders requested below.     CLAUDIA SOL RN on 3/22/2024 at 11:33 AM   [facility]

## 2024-03-22 NOTE — TELEPHONE ENCOUNTER
I think I signed these yesterday. I don't have any additional paperwork for Barb.    Willie Soto,   3/22/2024 11:09 AM

## 2024-03-25 ENCOUNTER — MEDICAL CORRESPONDENCE (OUTPATIENT)
Dept: HEALTH INFORMATION MANAGEMENT | Facility: CLINIC | Age: 84
End: 2024-03-25
Payer: COMMERCIAL

## 2024-03-25 ENCOUNTER — TELEPHONE (OUTPATIENT)
Dept: FAMILY MEDICINE | Facility: CLINIC | Age: 84
End: 2024-03-25
Payer: COMMERCIAL

## 2024-03-25 NOTE — TELEPHONE ENCOUNTER
Forms/Letter Request    Type of form/letter: [id number]      Do we have the form/letter: Yes: given to Dr Soto    Who is the form from? Home care    Where did/will the form come from? form was faxed in    When is form/letter needed by: when available    How would you like the form/letter returned: Fax : [phone]

## 2024-03-25 NOTE — TELEPHONE ENCOUNTER
Form reviewed by Dr Soto  Signed and faxed back to [phone]  Sent to [facility]  And filed in the north drawer.

## 2024-03-25 NOTE — TELEPHONE ENCOUNTER
Form reviewed by Dr Soto  Faxed back to [phone]  Sent to hims  And filed [facility] scottie.

## 2024-03-26 ENCOUNTER — TELEPHONE (OUTPATIENT)
Dept: FAMILY MEDICINE | Facility: CLINIC | Age: 84
End: 2024-03-26
Payer: COMMERCIAL

## 2024-03-26 NOTE — TELEPHONE ENCOUNTER
Order/Referral Request    Who is requesting: [facility] calling for Verbal Orders - NO Form    Orders being requested: OT 4wk8,     Reason service is needed/diagnosis: Cronic Kidney Disease    When are orders needed by: asap    Has this been discussed with Provider: No    Does patient have a preference on a Group/Provider/Facility? above    Does patient have an appointment scheduled?: No    Where to send orders:   call [phone] - [facility]    Could we send this information to you in [facility] or would you prefer to receive a phone call?:   see above    Gisela GOSS

## 2024-03-28 NOTE — TELEPHONE ENCOUNTER
Eden calling again.      Home Care is calling regarding an established patient with [facility].       Requesting orders from: Reba Martino  Provider is following patient: Yes  Is this a 60-day recertification request?  Yes    Orders Requested    Occupational Therapy  Request for recertification   Frequency:  4x/wk for 8 wks      Confirmed ok to leave a detailed message with call back.  Contact information confirmed and updated as needed.    Tatiana Ngo RN

## 2024-03-31 ENCOUNTER — MEDICAL CORRESPONDENCE (OUTPATIENT)
Dept: HEALTH INFORMATION MANAGEMENT | Facility: CLINIC | Age: 84
End: 2024-03-31

## 2024-04-01 NOTE — TELEPHONE ENCOUNTER
Called # below     Left a detailed VM  with verbal orders     Beverly England RN, BSN  [facility]

## 2024-04-23 ENCOUNTER — NURSE TRIAGE (OUTPATIENT)
Dept: FAMILY MEDICINE | Facility: CLINIC | Age: 84
End: 2024-04-23

## 2024-04-23 ENCOUNTER — TELEPHONE (OUTPATIENT)
Dept: FAMILY MEDICINE | Facility: CLINIC | Age: 84
End: 2024-04-23

## 2024-04-23 NOTE — TELEPHONE ENCOUNTER
Home Health Care      Reason for call:  [facility]  Care - Order #[id number] - Cert Period: 03/31/24 -05/29/24 - OT     Orders are needed for this patient.  OT:   1wk1 1every 2wk6    Pt Provider: Charles    Phone Number Homecare Nurse can be reached at: Fax [phone]      Could we send this information to you in [employer] or would you prefer to receive a phone call?:   na    Put I providers in box    Gisela K

## 2024-04-23 NOTE — TELEPHONE ENCOUNTER
Appointments in Next Year      Apr 25, 2024  9:30 AM  (Arrive by 9:10 AM)  Provider Visit with Willie Soto DO  [facility] ([facility] ) [phone]          Advised niece on the information below she stated she cannot get pt in tomorrow as she is having some very important things happening tomorrow and this has been slowing showing with leg swelling and she is not in any distress, she did say if pt were to show worsening symptoms she should take her to the ER - but for now she is just having the leg swelling and no other symptoms so she thinks pt ca wait till Thursday     RN reviewed worsening symptoms and if they were to happen she needs to take pt to the ER ASAP.     Patient's niece  stated an understanding and agreed with plan.      Beverly England RN, BSN  [facility] Triage

## 2024-04-23 NOTE — TELEPHONE ENCOUNTER
Nurse Triage SBAR    Is this a 2nd Level Triage? No    Situation: Called back to Pt misti (CTC) on file.     Background: Niece is not with Pt so not able to fully triage.     Assessment: States Pt has been having LE swelling for about 6 months and it is worsening. No SOB or pain. Some redness noted.     Protocol Recommended Disposition:   See in Office Today No appointments advised niece Pt should be seen in ED given the amount of swelling but niece doubts she will agree.     Recommendation: OK to schedule in clinic on Thursday in a same day or next day? Or potentially with Dr. Schaeffer tomorrow at 2:30?    Routed to provider    Does the patient meet one of the following criteria for ADS visit consideration? 16+ years old, with an [facility] PCP     TIP  Providers, please consider if this condition is appropriate for management at one of our Acute and Diagnostic Services sites.     If patient is a good candidate, please use dotphrase <dot>triageresponse and select Refer to ADS to document.   Reason for Disposition   MODERATE swelling of both ankles (e.g., swelling extends up to the knees) AND new-onset or worsening    Additional Information   Negative: Sounds like a life-threatening emergency to the triager   Negative: Chest pain   Negative: Followed an insect bite and has localized swelling (e.g., small area of puffy or swollen skin)   Negative: Followed a knee injury   Negative: Ankle or foot injury   Negative: Pregnant with leg swelling or edema   Negative: Difficulty breathing at rest   Negative: Entire foot is cool or blue in comparison to other side   Negative: SEVERE swelling (e.g., swelling extends above knee, entire leg is swollen, weeping fluid)   Negative: Cast on leg or ankle and has increasing pain   Negative: Can't walk or can barely stand (new-onset)   Negative: Fever and red area (or area very tender to touch)   Negative: Patient sounds very sick or weak to the triager   Negative: Swelling of face, arm or  "hands  (Exception: Slight puffiness of fingers during hot weather.)   Negative: Pregnant 20 or more weeks and sudden weight gain (i.e., > 2 lbs, 1 kg in one week)   Negative: Thigh or calf pain and only 1 side and present > 1 hour   Negative: Thigh, calf, or ankle swelling in only one leg   Negative: Thigh, calf, or ankle swelling in both legs, but one side is definitely more swollen (Exception: Longstanding difference between legs.)    Answer Assessment - Initial Assessment Questions  1. ONSET: \"When did the swelling start?\" (e.g., minutes, hours, days)      6 months worsening recently  2. LOCATION: \"What part of the leg is swollen?\"  \"Are both legs swollen or just one leg?\"      Up to knees, both legs equally   3. SEVERITY: \"How bad is the swelling?\" (e.g., localized; mild, moderate, severe)    - Localized: Small area of swelling localized to one leg.    - MILD pedal edema: Swelling limited to foot and ankle, pitting edema < 1/4 inch (6 mm) deep, rest and elevation eliminate most or all swelling.    - MODERATE edema: Swelling of lower leg to knee, pitting edema > 1/4 inch (6 mm) deep, rest and elevation only partially reduce swelling.    - SEVERE edema: Swelling extends above knee, facial or hand swelling present.       Moderate   4. REDNESS: \"Does the swelling look red or infected?\"      Some redness   5. PAIN: \"Is the swelling painful to touch?\" If Yes, ask: \"How painful is it?\"   (Scale 1-10; mild, moderate or severe)      No pain   6. FEVER: \"Do you have a fever?\" If Yes, ask: \"What is it, how was it measured, and when did it start?\"       N/A  7. CAUSE: \"What do you think is causing the leg swelling?\"      Does not drink enough water  8. MEDICAL HISTORY: \"Do you have a history of blood clots (e.g., DVT), cancer, heart failure, kidney disease, or liver failure?\"      Chronic kidney disease   9. RECURRENT SYMPTOM: \"Have you had leg swelling before?\" If Yes, ask: \"When was the last time?\" \"What happened that " "time?\"      N/A  10. OTHER SYMPTOMS: \"Do you have any other symptoms?\" (e.g., chest pain, difficulty breathing)        No chest pain, no SOB  11. PREGNANCY: \"Is there any chance you are pregnant?\" \"When was your last menstrual period?\"        N/A    Protocols used: Leg Swelling and Edema-A-OH    "

## 2024-04-23 NOTE — TELEPHONE ENCOUNTER
Dr Soto saw in 11/2023, any chance he has an opening in AM, best for ER evaluation as has had acute changes

## 2024-04-23 NOTE — TELEPHONE ENCOUNTER
Requesting to speak with care team at [facility]. Patient has been having significant swelling in both legs, ankles, and feet, getting worse.  Wants to see if she can be seen this week.  Please call to discuss this.  OK to LM on VM

## 2024-04-24 DIAGNOSIS — Z53.9 DIAGNOSIS NOT YET DEFINED: Primary | ICD-10-CM

## 2024-04-24 PROCEDURE — G0179 MD RECERTIFICATION HHA PT: HCPCS | Performed by: FAMILY MEDICINE

## 2024-04-24 NOTE — TELEPHONE ENCOUNTER
Form signed by Dr Soto  Faxed to [phone]  Sent to [facility]  And filed in [facility].

## 2024-04-25 ENCOUNTER — OFFICE VISIT (OUTPATIENT)
Dept: FAMILY MEDICINE | Facility: CLINIC | Age: 84
End: 2024-04-25
Payer: COMMERCIAL

## 2024-04-25 VITALS
OXYGEN SATURATION: 93 % | TEMPERATURE: 99.1 F | WEIGHT: 177 LBS | BODY MASS INDEX: 28.45 KG/M2 | HEIGHT: 66 IN | SYSTOLIC BLOOD PRESSURE: 138 MMHG | HEART RATE: 73 BPM | DIASTOLIC BLOOD PRESSURE: 78 MMHG | RESPIRATION RATE: 18 BRPM

## 2024-04-25 DIAGNOSIS — R41.89 COGNITIVE IMPAIRMENT: ICD-10-CM

## 2024-04-25 DIAGNOSIS — G62.9 POLYNEUROPATHY: ICD-10-CM

## 2024-04-25 DIAGNOSIS — N18.31 CHRONIC KIDNEY DISEASE, STAGE 3A (H): ICD-10-CM

## 2024-04-25 DIAGNOSIS — D51.0 PERNICIOUS ANEMIA: Primary | ICD-10-CM

## 2024-04-25 DIAGNOSIS — R73.03 PREDIABETES: ICD-10-CM

## 2024-04-25 DIAGNOSIS — G62.9 PERIPHERAL POLYNEUROPATHY: ICD-10-CM

## 2024-04-25 DIAGNOSIS — N30.00 ACUTE CYSTITIS WITHOUT HEMATURIA: Primary | ICD-10-CM

## 2024-04-25 DIAGNOSIS — R60.0 BILATERAL LOWER EXTREMITY EDEMA: ICD-10-CM

## 2024-04-25 DIAGNOSIS — R29.898 WEAKNESS OF BOTH LOWER EXTREMITIES: ICD-10-CM

## 2024-04-25 DIAGNOSIS — E78.5 HYPERLIPIDEMIA LDL GOAL <130: ICD-10-CM

## 2024-04-25 LAB
ALBUMIN UR-MCNC: NEGATIVE MG/DL
APPEARANCE UR: ABNORMAL
BACTERIA #/AREA URNS HPF: ABNORMAL /HPF
BILIRUB UR QL STRIP: NEGATIVE
COLOR UR AUTO: YELLOW
ERYTHROCYTE [DISTWIDTH] IN BLOOD BY AUTOMATED COUNT: 13 % (ref 10–15)
GLUCOSE UR STRIP-MCNC: NEGATIVE MG/DL
HBA1C MFR BLD: 5.6 % (ref 0–5.6)
HCT VFR BLD AUTO: 42.4 % (ref 35–47)
HGB BLD-MCNC: 14.6 G/DL (ref 11.7–15.7)
HGB UR QL STRIP: ABNORMAL
KETONES UR STRIP-MCNC: NEGATIVE MG/DL
LEUKOCYTE ESTERASE UR QL STRIP: ABNORMAL
MCH RBC QN AUTO: 33.4 PG (ref 26.5–33)
MCHC RBC AUTO-ENTMCNC: 34.4 G/DL (ref 31.5–36.5)
MCV RBC AUTO: 97 FL (ref 78–100)
NITRATE UR QL: POSITIVE
PH UR STRIP: 7 [PH] (ref 5–7)
PLATELET # BLD AUTO: 223 10E3/UL (ref 150–450)
RBC # BLD AUTO: 4.37 10E6/UL (ref 3.8–5.2)
RBC #/AREA URNS AUTO: ABNORMAL /HPF
SP GR UR STRIP: 1.02 (ref 1–1.03)
UROBILINOGEN UR STRIP-ACNC: 1 E.U./DL
WBC # BLD AUTO: 5.9 10E3/UL (ref 4–11)
WBC #/AREA URNS AUTO: ABNORMAL /HPF

## 2024-04-25 PROCEDURE — 83921 ORGANIC ACID SINGLE QUANT: CPT | Performed by: FAMILY MEDICINE

## 2024-04-25 PROCEDURE — 82306 VITAMIN D 25 HYDROXY: CPT | Performed by: FAMILY MEDICINE

## 2024-04-25 PROCEDURE — 84425 ASSAY OF VITAMIN B-1: CPT | Mod: 90 | Performed by: FAMILY MEDICINE

## 2024-04-25 PROCEDURE — 82043 UR ALBUMIN QUANTITATIVE: CPT | Performed by: FAMILY MEDICINE

## 2024-04-25 PROCEDURE — 82607 VITAMIN B-12: CPT | Performed by: FAMILY MEDICINE

## 2024-04-25 PROCEDURE — 83036 HEMOGLOBIN GLYCOSYLATED A1C: CPT | Performed by: FAMILY MEDICINE

## 2024-04-25 PROCEDURE — 87186 SC STD MICRODIL/AGAR DIL: CPT | Performed by: FAMILY MEDICINE

## 2024-04-25 PROCEDURE — 84207 ASSAY OF VITAMIN B-6: CPT | Mod: 90 | Performed by: FAMILY MEDICINE

## 2024-04-25 PROCEDURE — 80061 LIPID PANEL: CPT | Performed by: FAMILY MEDICINE

## 2024-04-25 PROCEDURE — 87088 URINE BACTERIA CULTURE: CPT | Performed by: FAMILY MEDICINE

## 2024-04-25 PROCEDURE — 82570 ASSAY OF URINE CREATININE: CPT | Performed by: FAMILY MEDICINE

## 2024-04-25 PROCEDURE — 85027 COMPLETE CBC AUTOMATED: CPT | Performed by: FAMILY MEDICINE

## 2024-04-25 PROCEDURE — 80053 COMPREHEN METABOLIC PANEL: CPT | Performed by: FAMILY MEDICINE

## 2024-04-25 PROCEDURE — 99215 OFFICE O/P EST HI 40 MIN: CPT | Performed by: FAMILY MEDICINE

## 2024-04-25 PROCEDURE — 36415 COLL VENOUS BLD VENIPUNCTURE: CPT | Performed by: FAMILY MEDICINE

## 2024-04-25 PROCEDURE — 87086 URINE CULTURE/COLONY COUNT: CPT | Performed by: FAMILY MEDICINE

## 2024-04-25 PROCEDURE — 99000 SPECIMEN HANDLING OFFICE-LAB: CPT | Performed by: FAMILY MEDICINE

## 2024-04-25 PROCEDURE — 84443 ASSAY THYROID STIM HORMONE: CPT | Performed by: FAMILY MEDICINE

## 2024-04-25 PROCEDURE — 81001 URINALYSIS AUTO W/SCOPE: CPT | Performed by: FAMILY MEDICINE

## 2024-04-25 RX ORDER — SULFAMETHOXAZOLE/TRIMETHOPRIM 800-160 MG
1 TABLET ORAL 2 TIMES DAILY
Qty: 6 TABLET | Refills: 0 | Status: SHIPPED | OUTPATIENT
Start: 2024-04-25 | End: 2024-04-28

## 2024-04-25 ASSESSMENT — ENCOUNTER SYMPTOMS: LEG PAIN: 1

## 2024-04-25 NOTE — PROGRESS NOTES
"  Assessment & Plan     Pernicious anemia  Recheck CBC, B12 levels  - Vitamin B12; Future  - Vitamin B12  - CBC with Platelets    Peripheral polyneuropathy  Recheck labs ordered by neurology  - Methylmalonic Acid; Future  - Vitamin B6; Future  - Vitamin B1 whole blood; Future  - Vitamin B1 whole blood  - Vitamin B6  - Methylmalonic Acid    Weakness of both lower extremities  - Vitamin B1 whole blood; Future  - Home Care Referral  - Vitamin B1 whole blood    Prediabetes  - Hemoglobin A1c; Future  - Hemoglobin A1c    Bilateral lower extremity edema  - Home Care Referral    Cognitive impairment  Check urinalysis and other labs for reversible causes of cognitive impairment. If normal consider formal neuropsych testing or consult with memory clinic.  - Home Care Referral  - UA Macroscopic with reflex to Microscopic and Culture - Lab Collect; Future  - UA Macroscopic with reflex to Microscopic and Culture - Lab Collect  - Urine Microscopic Exam  - Urine Culture    Hyperlipidemia LDL goal <130  - Lipid panel reflex to direct LDL Non-fasting    Chronic kidney disease, stage 3a (H)  - Albumin Random Urine Quantitative with Creat Ratio  - COMPREHENSIVE METABOLIC PANEL    Polyneuropathy  - TSH WITH FREE T4 REFLEX        BMI  Estimated body mass index is 28.57 kg/m  as calculated from the following:    Height as of this encounter: 1.676 m (5' 6\").    Weight as of this encounter: 80.3 kg (177 lb).         Sabine Day is a 84 year old, presenting for the following health issues:  Leg Pain        4/25/2024     9:26 AM   Additional Questions   Roomed by Kim GONCALVES     History of Present Illness       Reason for visit:  Edema- bilateral (Edema)  Symptom onset:  More than a month  Symptoms include:  Swelling  Symptom intensity:  Severe  Symptom progression:  Worsening  Had these symptoms before:  Yes  Has tried/received treatment for these symptoms:  Yes  Previous treatment was successful:  Yes  Prior treatment description:  " "Medication  What makes it worse:  No  What makes it better:  No    She eats 0-1 servings of fruits and vegetables daily.She consumes 0 sweetened beverage(s) daily.She exercises with enough effort to increase her heart rate 9 or less minutes per day.  She exercises with enough effort to increase her heart rate 3 or less days per week.   She is taking medications regularly.     OT and physical therapy have been coming out to see her. Increased leg swelling over the past 3-6 months. She isn't exercising as much. No chest pain, shortness of breath, dyspnea, PND, orthopnea. She does eat more pre-made meals. Probably doesn't drink enough water    Had cognitive testing by OT and niece mentions she was \"stage 4\"    She denies any urinary incontinence issues but niece notes there is.       Review of Systems  Constitutional, HEENT, cardiovascular, pulmonary, gi and gu systems are negative, except as otherwise noted.      Objective    /78   Pulse 73   Temp 99.1  F (37.3  C)   Resp 18   Ht 1.676 m (5' 6\")   Wt 80.3 kg (177 lb)   SpO2 93%   BMI 28.57 kg/m    Body mass index is 28.57 kg/m .  Physical Exam   GENERAL: alert and no distress  RESP: lungs clear to auscultation - no rales, rhonchi or wheezes  CV: regular rates and rhythm, normal S1 S2, no S3 or S4, and no murmur, click or rub  PSYCH: mentation appears normal, affect normal/bright          45 minutes spent by me on the date of the encounter doing chart review, review of test results, interpretation of tests, patient visit, documentation, and discussion with family       Signed Electronically by: Willie Soto DO  "

## 2024-04-26 ENCOUNTER — TELEPHONE (OUTPATIENT)
Dept: FAMILY MEDICINE | Facility: CLINIC | Age: 84
End: 2024-04-26

## 2024-04-26 DIAGNOSIS — R60.0 BILATERAL LOWER EXTREMITY EDEMA: Primary | ICD-10-CM

## 2024-04-26 LAB
ALBUMIN SERPL BCG-MCNC: 4 G/DL (ref 3.5–5.2)
ALP SERPL-CCNC: 87 U/L (ref 40–150)
ALT SERPL W P-5'-P-CCNC: 13 U/L (ref 0–50)
ANION GAP SERPL CALCULATED.3IONS-SCNC: 10 MMOL/L (ref 7–15)
AST SERPL W P-5'-P-CCNC: 21 U/L (ref 0–45)
BILIRUB SERPL-MCNC: 0.8 MG/DL
BUN SERPL-MCNC: 14.7 MG/DL (ref 8–23)
CALCIUM SERPL-MCNC: 9.6 MG/DL (ref 8.8–10.2)
CHLORIDE SERPL-SCNC: 105 MMOL/L (ref 98–107)
CHOLEST SERPL-MCNC: 173 MG/DL
CREAT SERPL-MCNC: 0.89 MG/DL (ref 0.51–0.95)
CREAT UR-MCNC: 52.9 MG/DL
DEPRECATED HCO3 PLAS-SCNC: 26 MMOL/L (ref 22–29)
EGFRCR SERPLBLD CKD-EPI 2021: 63 ML/MIN/1.73M2
FASTING STATUS PATIENT QL REPORTED: YES
GLUCOSE SERPL-MCNC: 104 MG/DL (ref 70–99)
HDLC SERPL-MCNC: 45 MG/DL
LDLC SERPL CALC-MCNC: 113 MG/DL
MICROALBUMIN UR-MCNC: 27.4 MG/L
MICROALBUMIN/CREAT UR: 51.8 MG/G CR (ref 0–25)
NONHDLC SERPL-MCNC: 128 MG/DL
POTASSIUM SERPL-SCNC: 4.4 MMOL/L (ref 3.4–5.3)
PROT SERPL-MCNC: 7.1 G/DL (ref 6.4–8.3)
SODIUM SERPL-SCNC: 141 MMOL/L (ref 135–145)
TRIGL SERPL-MCNC: 77 MG/DL
TSH SERPL DL<=0.005 MIU/L-ACNC: 3.56 UIU/ML (ref 0.3–4.2)
VIT B12 SERPL-MCNC: 581 PG/ML (ref 232–1245)
VIT D+METAB SERPL-MCNC: 18 NG/ML (ref 20–50)

## 2024-04-26 RX ORDER — FUROSEMIDE 20 MG
20 TABLET ORAL DAILY
Qty: 30 TABLET | Refills: 0 | Status: SHIPPED | OUTPATIENT
Start: 2024-04-26 | End: 2024-06-10

## 2024-04-26 NOTE — TELEPHONE ENCOUNTER
"Daughter María calls back.     Advised of result note. She needs to get home care set up before starting medication. Number given for [facility].   Once patient starts Lasix, she will call for lab appointment.     CLAUDIA SOL RN on 4/26/2024 at 1:16 PM   [facility]        \"Overall, blood work looks good! I think we can try starting a low-dose of a diuretic called furosemide (Lasix) taken once daily in addition to compression stockings, elevating feet/legs to help with lower extremity swelling. Once started, we should recheck your electrolytes and kidney function in about a week.        I tried calling María with these results this afternoon and will try calling again later this afternoon to make sure there aren't any other questions!     Please contact me if you have any questions.\"         "

## 2024-04-27 LAB
BACTERIA UR CULT: ABNORMAL
BACTERIA UR CULT: ABNORMAL

## 2024-04-29 LAB
PYRIDOXAL PHOS SERPL-SCNC: 24.9 NMOL/L
VIT B1 PYROPHOSHATE BLD-SCNC: 107 NMOL/L

## 2024-04-30 ENCOUNTER — TELEPHONE (OUTPATIENT)
Dept: FAMILY MEDICINE | Facility: CLINIC | Age: 84
End: 2024-04-30
Payer: COMMERCIAL

## 2024-04-30 LAB — METHYLMALONATE SERPL-SCNC: 0.19 UMOL/L (ref 0–0.4)

## 2024-05-02 ENCOUNTER — MEDICAL CORRESPONDENCE (OUTPATIENT)
Dept: HEALTH INFORMATION MANAGEMENT | Facility: CLINIC | Age: 84
End: 2024-05-02
Payer: COMMERCIAL

## 2024-05-03 ENCOUNTER — MEDICAL CORRESPONDENCE (OUTPATIENT)
Dept: HEALTH INFORMATION MANAGEMENT | Facility: CLINIC | Age: 84
End: 2024-05-03
Payer: COMMERCIAL

## 2024-05-03 ENCOUNTER — TELEPHONE (OUTPATIENT)
Dept: FAMILY MEDICINE | Facility: CLINIC | Age: 84
End: 2024-05-03
Payer: COMMERCIAL

## 2024-05-03 NOTE — TELEPHONE ENCOUNTER
Home Health Care        Reason for call:  [facility] - Order # [id number] - Cert period: 03/31/24 - 05/29/24 - OT Effect 04/28/24  [id number], [id number]    Orders are needed for this patient.  OT     Pt Provider: Charles    Phone Number Homecare Nurse can be reached at:  [phone]      Could we send this information to you in [employer]Voice or would you prefer to receive a phone call?:   na  Put  in providers in box    Gisela GOSS

## 2024-05-06 NOTE — TELEPHONE ENCOUNTER
Signed by Dr Soto  Faxed back to [phone]  Sent to [facility]  And filed in [facility].

## 2024-05-08 ENCOUNTER — TELEPHONE (OUTPATIENT)
Dept: FAMILY MEDICINE | Facility: CLINIC | Age: 84
End: 2024-05-08
Payer: COMMERCIAL

## 2024-05-08 NOTE — TELEPHONE ENCOUNTER
Pts daughter calling asking when does pt need to follow up for her vitamin D lab     RN reviewed : -Vitamin D level is low and oral supplementation should be started.  ADVISE: starting over the counter Cholecalciferol (Vitamin D3) 5000 IU daily for 6 weeks and then 1000 IU daily to maintain levels.  Then in 2-3 months, please schedule a lab only appointment to recheck your Vitamin D levels.  -Microalbumin (urine protein) level is elevated. This is suggestive of early damage to your kidneys. ADVISE: avoiding anti-inflamatory agents such as ibuprofen (Advil, Motrin) or naproxen (Aleve) as much as possible, keeping your blood pressure in a normal range.    Patient's daughter stated an understanding and agreed with plan.    Beverly England RN, BSN  [facility]

## 2024-06-10 ENCOUNTER — MYC MEDICAL ADVICE (OUTPATIENT)
Dept: FAMILY MEDICINE | Facility: CLINIC | Age: 84
End: 2024-06-10
Payer: COMMERCIAL

## 2024-06-10 DIAGNOSIS — R60.0 BILATERAL LOWER EXTREMITY EDEMA: ICD-10-CM

## 2024-06-10 RX ORDER — FUROSEMIDE 40 MG
40 TABLET ORAL DAILY
Qty: 30 TABLET | Refills: 0 | Status: SHIPPED | OUTPATIENT
Start: 2024-06-10 | End: 2024-07-16

## 2024-06-10 NOTE — TELEPHONE ENCOUNTER
We can try increased dose of Lasix but need to recheck her labs in 1-2 weeks. Please schedule a lab-only visit.     Willie Soto,   6/10/2024 12:57 PM

## 2024-06-10 NOTE — TELEPHONE ENCOUNTER
Medication Question or Refill    Contacts         Type Contact Phone/Fax    06/10/2024 11:03 AM CDT Phone (Incoming) María Loja (Emergency Contact)             What medication are you calling about (include dose and sig)?: furosemide (LASIX) 20 MG tablet  ( starting with the starting dose.)    Preferred Pharmacy:   [id number] [place] [id number] [address] [address]  Phone: [phone] Fax: [phone]      Controlled Substance Agreement on file:   CSA -- Patient Level:    CSA: None found at the patient level.       Who prescribed the medication?: Charles    Do you need a refill? Yes  Her niece see do diffence in her legs with this dose, no weight change or diameter in her legs.  She is asking if the next step of dosage can be prescribed?    When did you use the medication last? daily    Patient offered an appointment? Yes: doesn't want to come in    Do you have any questions or concerns?  Yes: nothing is happening on the 20 mg dose.   No side effects or change      Could we send this information to you in [employer] or would you prefer to receive a phone call?:   Patient would prefer a phone call   Okay to leave a detailed message?: Yes at Cell number on file:    Telephone Information:   Mobile [phone]

## 2024-07-16 ENCOUNTER — OFFICE VISIT (OUTPATIENT)
Dept: FAMILY MEDICINE | Facility: CLINIC | Age: 84
End: 2024-07-16
Payer: COMMERCIAL

## 2024-07-16 VITALS
WEIGHT: 176 LBS | BODY MASS INDEX: 28.28 KG/M2 | SYSTOLIC BLOOD PRESSURE: 104 MMHG | HEIGHT: 66 IN | DIASTOLIC BLOOD PRESSURE: 70 MMHG | TEMPERATURE: 99.3 F | OXYGEN SATURATION: 94 % | HEART RATE: 94 BPM | RESPIRATION RATE: 16 BRPM

## 2024-07-16 DIAGNOSIS — R60.0 BILATERAL LOWER EXTREMITY EDEMA: ICD-10-CM

## 2024-07-16 DIAGNOSIS — R32 URINARY INCONTINENCE, UNSPECIFIED TYPE: ICD-10-CM

## 2024-07-16 DIAGNOSIS — N30.00 ACUTE CYSTITIS WITHOUT HEMATURIA: Primary | ICD-10-CM

## 2024-07-16 DIAGNOSIS — D51.0 PERNICIOUS ANEMIA: ICD-10-CM

## 2024-07-16 LAB
ALBUMIN UR-MCNC: NEGATIVE MG/DL
APPEARANCE UR: CLEAR
BACTERIA #/AREA URNS HPF: ABNORMAL /HPF
BILIRUB UR QL STRIP: NEGATIVE
COLOR UR AUTO: YELLOW
GLUCOSE UR STRIP-MCNC: NEGATIVE MG/DL
HGB UR QL STRIP: ABNORMAL
KETONES UR STRIP-MCNC: NEGATIVE MG/DL
LEUKOCYTE ESTERASE UR QL STRIP: ABNORMAL
NITRATE UR QL: POSITIVE
PH UR STRIP: 5.5 [PH] (ref 5–7)
RBC #/AREA URNS AUTO: ABNORMAL /HPF
SP GR UR STRIP: 1.02 (ref 1–1.03)
SQUAMOUS #/AREA URNS AUTO: ABNORMAL /LPF
UROBILINOGEN UR STRIP-ACNC: 2 E.U./DL
WBC #/AREA URNS AUTO: ABNORMAL /HPF
WBC CLUMPS #/AREA URNS HPF: PRESENT /HPF

## 2024-07-16 PROCEDURE — 99213 OFFICE O/P EST LOW 20 MIN: CPT | Performed by: FAMILY MEDICINE

## 2024-07-16 PROCEDURE — 80048 BASIC METABOLIC PNL TOTAL CA: CPT | Performed by: FAMILY MEDICINE

## 2024-07-16 PROCEDURE — 87186 SC STD MICRODIL/AGAR DIL: CPT | Performed by: FAMILY MEDICINE

## 2024-07-16 PROCEDURE — 87086 URINE CULTURE/COLONY COUNT: CPT | Performed by: FAMILY MEDICINE

## 2024-07-16 PROCEDURE — 36415 COLL VENOUS BLD VENIPUNCTURE: CPT | Performed by: FAMILY MEDICINE

## 2024-07-16 PROCEDURE — 81001 URINALYSIS AUTO W/SCOPE: CPT | Performed by: FAMILY MEDICINE

## 2024-07-16 PROCEDURE — 87088 URINE BACTERIA CULTURE: CPT | Performed by: FAMILY MEDICINE

## 2024-07-16 RX ORDER — RESPIRATORY SYNCYTIAL VIRUS VACCINE 120MCG/0.5
0.5 KIT INTRAMUSCULAR ONCE
Qty: 1 EACH | Refills: 0 | Status: CANCELLED | OUTPATIENT
Start: 2024-07-16 | End: 2024-07-16

## 2024-07-16 RX ORDER — CYANOCOBALAMIN 1000 UG/ML
1000 INJECTION, SOLUTION INTRAMUSCULAR; SUBCUTANEOUS
Qty: 3 ML | Refills: 3 | Status: SHIPPED | OUTPATIENT
Start: 2024-07-16

## 2024-07-16 RX ORDER — SULFAMETHOXAZOLE/TRIMETHOPRIM 800-160 MG
1 TABLET ORAL 2 TIMES DAILY
Qty: 10 TABLET | Refills: 0 | Status: SHIPPED | OUTPATIENT
Start: 2024-07-16 | End: 2024-07-21

## 2024-07-16 RX ORDER — FUROSEMIDE 40 MG
40 TABLET ORAL DAILY
Qty: 90 TABLET | Refills: 1 | Status: SHIPPED | OUTPATIENT
Start: 2024-07-16

## 2024-07-16 ASSESSMENT — PAIN SCALES - GENERAL: PAINLEVEL: NO PAIN (0)

## 2024-07-16 NOTE — PROGRESS NOTES
"  Assessment & Plan     Acute cystitis without hematuria  - sulfamethoxazole-trimethoprim (BACTRIM DS) 800-160 MG tablet; Take 1 tablet by mouth 2 times daily for 5 days    Urinary incontinence, unspecified type  Worsening incontinence may be due to urinary tract infection. Treat as above  - UA Macroscopic with reflex to Microscopic and Culture - Lab Collect; Future  - UA Macroscopic with reflex to Microscopic and Culture - Lab Collect  - Urine Microscopic Exam  - Urine Culture    Bilateral lower extremity edema  Improvement with higher dose lasix. Continue 40 mg daily. Recheck electrolytes and kidney function  - furosemide (LASIX) 40 MG tablet; Take 1 tablet (40 mg) by mouth daily  - Basic metabolic panel  (Ca, Cl, CO2, Creat, Gluc, K, Na, BUN)    Pernicious anemia  Continue B12  - cyanocobalamin (CYANOCOBALAMIN) 1000 MCG/ML injection; Inject 1 mL (1,000 mcg) into the muscle every 30 days          BMI  Estimated body mass index is 28.41 kg/m  as calculated from the following:    Height as of this encounter: 1.676 m (5' 6\").    Weight as of this encounter: 79.8 kg (176 lb).         Sabine Day is a 84 year old, presenting for the following health issues:  Recheck Medication (Follow up on her medications, needs refills, leg swelling, getting a little weaker, more frequent incontinence, wondering about a UTI, family is concerned with some more changes in her memory/conditions.  Would like to update/complete her POLST. )        7/16/2024     2:10 PM   Additional Questions   Roomed by Za Lopez CMA   Accompanied by Skylar Smart         7/16/2024   Forms   Any forms needing to be completed Yes        History of Present Illness       Reason for visit:  Annual visit med check    She eats 0-1 servings of fruits and vegetables daily.She consumes 0 sweetened beverage(s) daily.She exercises with enough effort to increase her heart rate 9 or less minutes per day.  She exercises with enough effort to increase her heart " "rate 3 or less days per week.   She is taking medications regularly.       Medication Followup of B12 & Lasix  Taking Medication as prescribed: yes  Side Effects:  None  Medication Helping Symptoms:  yes      Review of Systems  Constitutional, HEENT, cardiovascular, pulmonary, gi and gu systems are negative, except as otherwise noted.      Objective    /70 (BP Location: Right arm, Patient Position: Sitting, Cuff Size: Adult Regular)   Pulse 94   Temp 99.3  F (37.4  C) (Tympanic)   Resp 16   Ht 1.676 m (5' 6\")   Wt 79.8 kg (176 lb)   SpO2 94%   BMI 28.41 kg/m    Body mass index is 28.41 kg/m .  Physical Exam   GENERAL: alert and no distress  RESP: lungs clear to auscultation - no rales, rhonchi or wheezes  CV: regular rates and rhythm, normal S1 S2, no S3 or S4, and no murmur, click or rub  MS: extremities normal- no gross deformities noted  2+ pitting edema in bilateral lower extremities    Results for orders placed or performed in visit on 07/16/24   UA Macroscopic with reflex to Microscopic and Culture - Lab Collect     Status: Abnormal    Specimen: Urine, Midstream   Result Value Ref Range    Color Urine Yellow Colorless, Straw, Light Yellow, Yellow    Appearance Urine Clear Clear    Glucose Urine Negative Negative mg/dL    Bilirubin Urine Negative Negative    Ketones Urine Negative Negative mg/dL    Specific Gravity Urine 1.020 1.003 - 1.035    Blood Urine Small (A) Negative    pH Urine 5.5 5.0 - 7.0    Protein Albumin Urine Negative Negative mg/dL    Urobilinogen Urine 2.0 (A) 0.2, 1.0 E.U./dL    Nitrite Urine Positive (A) Negative    Leukocyte Esterase Urine Small (A) Negative   Urine Microscopic Exam     Status: Abnormal   Result Value Ref Range    Bacteria Urine Many (A) None Seen /HPF    RBC Urine 0-2 0-2 /HPF /HPF    WBC Urine 5-10 (A) 0-5 /HPF /HPF    Squamous Epithelials Urine Few (A) None Seen /LPF    WBC Clumps Urine Present (A) None Seen /HPF             Signed Electronically by: Willie DUGAN" Charles,

## 2024-07-17 ENCOUNTER — DOCUMENTATION ONLY (OUTPATIENT)
Dept: OTHER | Facility: CLINIC | Age: 84
End: 2024-07-17
Payer: COMMERCIAL

## 2024-07-17 LAB
ANION GAP SERPL CALCULATED.3IONS-SCNC: 10 MMOL/L (ref 7–15)
BUN SERPL-MCNC: 19.5 MG/DL (ref 8–23)
CALCIUM SERPL-MCNC: 9.6 MG/DL (ref 8.8–10.4)
CHLORIDE SERPL-SCNC: 104 MMOL/L (ref 98–107)
CREAT SERPL-MCNC: 1.25 MG/DL (ref 0.51–0.95)
EGFRCR SERPLBLD CKD-EPI 2021: 42 ML/MIN/1.73M2
GLUCOSE SERPL-MCNC: 108 MG/DL (ref 70–99)
HCO3 SERPL-SCNC: 26 MMOL/L (ref 22–29)
POTASSIUM SERPL-SCNC: 4.2 MMOL/L (ref 3.4–5.3)
SODIUM SERPL-SCNC: 140 MMOL/L (ref 135–145)

## 2024-07-19 LAB — BACTERIA UR CULT: ABNORMAL

## 2024-08-27 ENCOUNTER — TELEPHONE (OUTPATIENT)
Dept: FAMILY MEDICINE | Facility: CLINIC | Age: 84
End: 2024-08-27
Payer: COMMERCIAL

## 2024-08-27 DIAGNOSIS — N18.31 CHRONIC KIDNEY DISEASE, STAGE 3A (H): ICD-10-CM

## 2024-08-27 DIAGNOSIS — G62.9 PERIPHERAL POLYNEUROPATHY: Primary | ICD-10-CM

## 2024-08-27 DIAGNOSIS — R41.89 COGNITIVE IMPAIRMENT: ICD-10-CM

## 2024-08-27 NOTE — TELEPHONE ENCOUNTER
Order/Referral Request    Who is requesting: Pt    Orders being requested: home care services    Reason service is needed/diagnosis: na    When are orders needed by: asap    Has this been discussed with Provider: No    Does patient have a preference on a Group/Provider/Facility? na    Does patient have an appointment scheduled?: No    Where to send orders: Fax and Place orders within Epic    Could we send this information to you in [facility] or would you prefer to receive a phone call?:   Patient would prefer a phone call   Okay to leave a detailed message?: Yes at Home number on file [phone] (home)

## 2024-08-27 NOTE — TELEPHONE ENCOUNTER
Telephone encounter was made to provider for a referral for pt to get in home services.  They have one now, but do not like.  Once the referral is done, instructions to call daughter and let her know.    Encountered on chart.    Closed encounter    Gisela GOSS

## 2024-08-27 NOTE — TELEPHONE ENCOUNTER
RV triage can you please call patient or YARELI and see what is needed for her?     Dr. Soto saw her and ordered home care so I am unclear what is needed at this point or if we can change who she sees?        Healthy regards,            Reba Martino, P-BC

## 2024-08-27 NOTE — TELEPHONE ENCOUNTER
General Call      Reason for Call:  María Berry - JOCELYNN - no ability - called in to arrange services in home for Home Care - Nurse evaluation and Home Care Services.  Needs a referral for this service.    What are your questions or concerns:  [facility] has sent out PT and OT sent here via fax.  Discharged.  Now declined and needs home care - has it now, not happy - wants to switch with us.    Date of last appointment with provider: 07/16/24 Teja Soto    Could we send this information to you in [employer] or would you prefer to receive a phone call?:       María #[phone] - call when referral Is complete.    Gisela GOSS

## 2024-08-28 NOTE — TELEPHONE ENCOUNTER
Attempt #1  Called Phone # [phone]      Left a non detailed voicemail to please call back and ask for any available triage nurse regarding your phone message from yesterday @ [phone].     Brionna CATHERINE RN   [facility]

## 2024-08-28 NOTE — TELEPHONE ENCOUNTER
Berry Daniel (CTC on file) is calling back.    Is looking for order for home care services through [facility]. She has spoken to [facility]. She will switch to Dr. Soto for PCP since he saw her the last few visits. PCP updated in [facility].     Needing help 3-4 times a week, shower, light housekeeping,  care, transportation, medication administration and personal care. I did inform I am not sure if  care, light housekeeping, and transportation would fall under the home care orders or not. Discussed possible referral for Care Coordination but María will wait to see what [facility] can provide.     Dr. Soto are you able to place home care orders?    Licha Baca R.N.

## 2024-10-17 ENCOUNTER — PATIENT OUTREACH (OUTPATIENT)
Dept: CARE COORDINATION | Facility: CLINIC | Age: 84
End: 2024-10-17
Payer: COMMERCIAL

## 2024-10-31 ENCOUNTER — PATIENT OUTREACH (OUTPATIENT)
Dept: CARE COORDINATION | Facility: CLINIC | Age: 84
End: 2024-10-31
Payer: COMMERCIAL

## 2024-12-22 ENCOUNTER — HEALTH MAINTENANCE LETTER (OUTPATIENT)
Age: 84
End: 2024-12-22

## 2025-01-07 DIAGNOSIS — D51.0 PERNICIOUS ANEMIA: ICD-10-CM

## 2025-01-08 RX ORDER — SYRINGE WITH NEEDLE, 1 ML 25GX5/8"
SYRINGE, EMPTY DISPOSABLE MISCELLANEOUS
Qty: 12 EACH | Refills: 0 | Status: SHIPPED | OUTPATIENT
Start: 2025-01-08

## 2025-03-18 ENCOUNTER — VIRTUAL VISIT (OUTPATIENT)
Dept: FAMILY MEDICINE | Facility: CLINIC | Age: 85
End: 2025-03-18
Payer: COMMERCIAL

## 2025-03-18 DIAGNOSIS — R60.0 BILATERAL LOWER EXTREMITY EDEMA: ICD-10-CM

## 2025-03-18 DIAGNOSIS — R41.3 MEMORY CHANGES: ICD-10-CM

## 2025-03-18 DIAGNOSIS — D51.0 PERNICIOUS ANEMIA: ICD-10-CM

## 2025-03-18 DIAGNOSIS — Z00.00 ENCOUNTER FOR MEDICARE ANNUAL WELLNESS EXAM: Primary | ICD-10-CM

## 2025-03-18 PROCEDURE — 99213 OFFICE O/P EST LOW 20 MIN: CPT | Mod: 25 | Performed by: FAMILY MEDICINE

## 2025-03-18 PROCEDURE — G0439 PPPS, SUBSEQ VISIT: HCPCS | Mod: 95 | Performed by: FAMILY MEDICINE

## 2025-03-18 RX ORDER — FUROSEMIDE 40 MG/1
40 TABLET ORAL DAILY
Qty: 90 TABLET | Refills: 3 | Status: SHIPPED | OUTPATIENT
Start: 2025-03-18

## 2025-03-18 RX ORDER — SYRINGE WITH NEEDLE, 1 ML 25GX5/8"
SYRINGE, EMPTY DISPOSABLE MISCELLANEOUS
Qty: 12 EACH | Refills: 0 | Status: SHIPPED | OUTPATIENT
Start: 2025-03-18

## 2025-03-18 RX ORDER — CYANOCOBALAMIN 1000 UG/ML
1000 INJECTION, SOLUTION INTRAMUSCULAR; SUBCUTANEOUS
Qty: 3 ML | Refills: 3 | Status: SHIPPED | OUTPATIENT
Start: 2025-03-18

## 2025-03-18 SDOH — HEALTH STABILITY: PHYSICAL HEALTH: ON AVERAGE, HOW MANY MINUTES DO YOU ENGAGE IN EXERCISE AT THIS LEVEL?: 0 MIN

## 2025-03-18 SDOH — HEALTH STABILITY: PHYSICAL HEALTH: ON AVERAGE, HOW MANY DAYS PER WEEK DO YOU ENGAGE IN MODERATE TO STRENUOUS EXERCISE (LIKE A BRISK WALK)?: 0 DAYS

## 2025-03-18 ASSESSMENT — SOCIAL DETERMINANTS OF HEALTH (SDOH): HOW OFTEN DO YOU GET TOGETHER WITH FRIENDS OR RELATIVES?: MORE THAN THREE TIMES A WEEK

## 2025-03-18 NOTE — PATIENT INSTRUCTIONS
Patient Education   Preventive Care Advice   This is general advice given by our system to help you stay healthy. However, your care team may have specific advice just for you. Please talk to your care team about your preventive care needs.  Nutrition  Eat 5 or more servings of fruits and vegetables each day.  Try wheat bread, brown rice and whole grain pasta (instead of white bread, rice, and pasta).  Get enough calcium and vitamin D. Check the label on foods and aim for 100% of the RDA (recommended daily allowance).  Lifestyle  Exercise at least 150 minutes each week  (30 minutes a day, 5 days a week).  Do muscle strengthening activities 2 days a week. These help control your weight and prevent disease.  No smoking.  Wear sunscreen to prevent skin cancer.  Have a dental exam and cleaning every 6 months.  Yearly exams  See your health care team every year to talk about:  Any changes in your health.  Any medicines your care team has prescribed.  Preventive care, family planning, and ways to prevent chronic diseases.  Shots (vaccines)   HPV shots (up to age 26), if you've never had them before.  Hepatitis B shots (up to age 59), if you've never had them before.  COVID-19 shot: Get this shot when it's due.  Flu shot: Get a flu shot every year.  Tetanus shot: Get a tetanus shot every 10 years.  Pneumococcal, hepatitis A, and RSV shots: Ask your care team if you need these based on your risk.  Shingles shot (for age 50 and up)  General health tests  Diabetes screening:  Starting at age 35, Get screened for diabetes at least every 3 years.  If you are younger than age 35, ask your care team if you should be screened for diabetes.  Cholesterol test: At age 39, start having a cholesterol test every 5 years, or more often if advised.  Bone density scan (DEXA): At age 50, ask your care team if you should have this scan for osteoporosis (brittle bones).  Hepatitis C: Get tested at least once in your life.  STIs (sexually  transmitted infections)  Before age 24: Ask your care team if you should be screened for STIs.  After age 24: Get screened for STIs if you're at risk. You are at risk for STIs (including HIV) if:  You are sexually active with more than one person.  You don't use condoms every time.  You or a partner was diagnosed with a sexually transmitted infection.  If you are at risk for HIV, ask about PrEP medicine to prevent HIV.  Get tested for HIV at least once in your life, whether you are at risk for HIV or not.  Cancer screening tests  Cervical cancer screening: If you have a cervix, begin getting regular cervical cancer screening tests starting at age 21.  Breast cancer scan (mammogram): If you've ever had breasts, begin having regular mammograms starting at age 40. This is a scan to check for breast cancer.  Colon cancer screening: It is important to start screening for colon cancer at age 45.  Have a colonoscopy test every 10 years (or more often if you're at risk) Or, ask your provider about stool tests like a FIT test every year or Cologuard test every 3 years.  To learn more about your testing options, visit:   .  For help making a decision, visit:   https://bit.ly/lm61115.  Prostate cancer screening test: If you have a prostate, ask your care team if a prostate cancer screening test (PSA) at age 55 is right for you.  Lung cancer screening: If you are a current or former smoker ages 50 to 80, ask your care team if ongoing lung cancer screenings are right for you.  For informational purposes only. Not to replace the advice of your health care provider. Copyright   2023 [facility]. All rights reserved. Clinically reviewed by the [facility] Transitions Program. [employer] 291916 - REV 01/24.  Preventing Falls: Care Instructions  Injuries and health problems such as trouble walking or poor eyesight can increase your risk of falling. So can some medicines. But there are things you can do to help  "prevent falls. You can exercise to get stronger. You can also arrange your home to make it safer.    Talk to your doctor about the medicines you take. Ask if any of them increase the risk of falls and whether they can be changed or stopped.   Try to exercise regularly. It can help improve your strength and balance. This can help lower your risk of falling.         Practice fall safety and prevention.   Wear low-heeled shoes that fit well and give your feet good support. Talk to your doctor if you have foot problems that make this hard.  Carry a cellphone or wear a medical alert device that you can use to call for help.  Use stepladders instead of chairs to reach high objects. Don't climb if you're at risk for falls. Ask for help, if needed.  Wear the correct eyeglasses, if you need them.        Make your home safer.   Remove rugs, cords, clutter, and furniture from walkways.  Keep your house well lit. Use night-lights in hallways and bathrooms.  Install and use sturdy handrails on stairways.  Wear nonskid footwear, even inside. Don't walk barefoot or in socks without shoes.        Be safe outside.   Use handrails, curb cuts, and ramps whenever possible.  Keep your hands free by using a shoulder bag or backpack.  Try to walk in well-lit areas. Watch out for uneven ground, changes in pavement, and debris.  Be careful in the winter. Walk on the grass or gravel when sidewalks are slippery. Use de-icer on steps and walkways. Add non-slip devices to shoes.    Put grab bars and nonskid mats in your shower or tub and near the toilet. Try to use a shower chair or bath bench when bathing.   Get into a tub or shower by putting in your weaker leg first. Get out with your strong side first. Have a phone or medical alert device in the bathroom with you.   Where can you learn more?  Go to https://www.Xirruswise.net/patiented  Enter G117 in the search box to learn more about \"Preventing Falls: Care Instructions.\"  Current as of: " July 31, 2024  Content Version: 14.4    3583-5303 [employer].   Care instructions adapted under license by your healthcare professional. If you have questions about a medical condition or this instruction, always ask your healthcare professional. [employer] disclaims any warranty or liability for your use of this information.    Bladder Training: Care Instructions  Your Care Instructions     Bladder training is used to treat urge incontinence and stress incontinence. Urge incontinence means that the need to urinate comes on so fast that you can't get to a toilet in time. Stress incontinence means that you leak urine because of pressure on your bladder. For example, it may happen when you laugh, cough, or lift something heavy.  Bladder training can increase how long you can wait before you have to urinate. It can also help your bladder hold more urine. And it can give you better control over the urge to urinate.  It is important to remember that bladder training takes a few weeks to a few months to make a difference. You may not see results right away, but don't give up.  Follow-up care is a key part of your treatment and safety. Be sure to make and go to all appointments, and call your doctor if you are having problems. It's also a good idea to know your test results and keep a list of the medicines you take.  How can you care for yourself at home?  Work with your doctor to come up with a bladder training program that is right for you. You may use one or more of the following methods.  Delayed urination  In the beginning, try to keep from urinating for 5 minutes after you first feel the need to go.  While you wait, take deep, slow breaths to relax. Kegel exercises can also help you delay the need to go to the bathroom.  After some practice, when you can easily wait 5 minutes to urinate, try to wait 10 minutes before you urinate.  Slowly increase the waiting period until you are able to  "control when you have to urinate.  Scheduled urination  Empty your bladder when you first wake up in the morning.  Schedule times throughout the day when you will urinate.  Start by going to the bathroom every hour, even if you don't need to go.  Slowly increase the time between trips to the bathroom.  When you have found a schedule that works well for you, keep doing it.  If you wake up during the night and have to urinate, do it. Apply your schedule to waking hours only.  Kegel exercises  These tighten and strengthen pelvic muscles, which can help you control the flow of urine. (If doing these exercises causes pain, stop doing them and talk with your doctor.) To do Kegel exercises:  Squeeze your muscles as if you were trying not to pass gas. Or squeeze your muscles as if you were stopping the flow of urine. Your belly, legs, and buttocks shouldn't move.  Hold the squeeze for 3 seconds, then relax for 5 to 10 seconds.  Start with 3 seconds, then add 1 second each week until you are able to squeeze for 10 seconds.  Repeat the exercise 10 times a session. Do 3 to 8 sessions a day.  When should you call for help?  Watch closely for changes in your health, and be sure to contact your doctor if:    Your incontinence is getting worse.     You do not get better as expected.   Where can you learn more?  Go to https://www.Pacinian.net/patiented  Enter V684 in the search box to learn more about \"Bladder Training: Care Instructions.\"  Current as of: April 30, 2024  Content Version: 14.4    9798-7735 [employer].   Care instructions adapted under license by your healthcare professional. If you have questions about a medical condition or this instruction, always ask your healthcare professional. [employer] disclaims any warranty or liability for your use of this information.       "

## 2025-03-18 NOTE — PROGRESS NOTES
"Preventive Care Visit  [facility] [place]  Willie Soto DO, Family Medicine  Mar 18, 2025    Barb is a 85 year old who is being evaluated via a billable video visit.    How would you like to obtain your AVS? MyChart  If the video visit is dropped, the invitation should be resent by: Text to cell phone: [phone]  Will anyone else be joining your video visit? No      Assessment & Plan     Encounter for Medicare annual wellness exam  Health maintenance reviewed and updated. Emphasized importance of balanced diet and regular exercise. She will be due for lab work -- will see if [facility] can do blood work.      Bilateral lower extremity edema  Stable with furosemide  - furosemide (LASIX) 40 MG tablet; Take 1 tablet (40 mg) by mouth daily.    Memory changes  Worsening memory. Still living at home but requiring outside help - PCA three times per week, family helping her. Refer to memory clinic for further evaluation and recommendations   - Memory Clinic Referral; Future    Pernicious anemia  - cyanocobalamin (CYANOCOBALAMIN) 1000 mcg/mL injection; Inject 1 mL (1,000 mcg) into the muscle every 30 days.  - Syringe/Needle, Disp, (B-D LUER-KE SYRINGE) 25G X 5/8\" 3 ML MISC; Use 1 syringe every 30 days    Patient has been advised of split billing requirements and indicates understanding: Yes        BMI  Estimated body mass index is 28.41 kg/m  as calculated from the following:    Height as of 7/16/24: 1.676 m (5' 6\").    Weight as of 7/16/24: 79.8 kg (176 lb).     Counseling  Appropriate preventive services were addressed with this patient via screening, questionnaire, or discussion as appropriate for fall prevention, nutrition, physical activity, Tobacco-use cessation, social engagement, weight loss and cognition.  Checklist reviewing preventive services available has been given to the patient.  Reviewed patient's diet, addressing concerns and/or questions.   The patient was instructed to " see the dentist every 6 months.   Information on urinary incontinence and treatment options given to patient.       Sabine Day is a 85 year old, presenting for the following:      Video Visit      Video Start Time: 2:04 PM    [facility]     [facility] -- comes out three times per week -- personal care aides - bathing, grooming, med reminders, some cleaning and food prep. She dresses herself. -- ?lab testing?    Physical therapy/OT saw her last year and felt like she was maximally improved. Home care nurses do encourage ongoing home exercises.     Having some more difficulty getting in and out of the house. Physically doing the same. Legs are improved while she is on the Lasix.       Advance Care Planning  Patient has a Health Care Directive on file        3/18/2025   General Health   How would you rate your overall physical health? (!) FAIR   Feel stress (tense, anxious, or unable to sleep) Not at all         3/18/2025   Nutrition   Diet: Regular (no restrictions)         3/18/2025   Exercise   Days per week of moderate/strenous exercise 0 days   Average minutes spent exercising at this level 0 min         3/18/2025   Social Factors   Frequency of gathering with friends or relatives More than three times a week   Worry food won't last until get money to buy more Patient declined   Food not last or not have enough money for food? Patient declined   Do you have housing? (Housing is defined as stable permanent housing and does not include staying ouside in a car, in a tent, in an abandoned building, in an overnight shelter, or couch-surfing.) Patient declined   Are you worried about losing your housing? Patient declined   Lack of transportation? Patient declined   Unable to get utilities (heat,electricity)? Patient declined         3/18/2025   Fall Risk   Fallen 2 or more times in the past year? No   Trouble with walking or balance? Yes   Reason Gait Speed Test Not Completed Virtual Visit - need  additional assessment in future face-to-face visit          3/18/2025   Activities of Daily Living- Home Safety   Needs help with the following daily activites None of the above   Safety concerns in the home None of the above         3/18/2025   Dental   Dentist two times every year? (!) NO         3/18/2025   Hearing Screening   Hearing concerns? None of the above         3/18/2025   Driving Risk Screening   Patient/family members have concerns about driving (!) DECLINE         3/18/2025   General Alertness/Fatigue Screening   Have you been more tired than usual lately? No         3/18/2025   Urinary Incontinence Screening   Bothered by leaking urine in past 6 months Yes           Today's PHQ-2 Score:       3/18/2025     1:53 PM   PHQ-2 ( 1999 [employer])   Q1: Little interest or pleasure in doing things 2   Q2: Feeling down, depressed or hopeless 0   PHQ-2 Score 2    Q1: Little interest or pleasure in doing things More than half the days   Q2: Feeling down, depressed or hopeless Not at all   PHQ-2 Score 2       Patient-reported           3/18/2025   Substance Use   Alcohol more than 3/day or more than 7/wk Not Applicable   Do you have a current opioid prescription? No   How severe/bad is pain from 1 to 10? 0/10 (No Pain)   Do you use any other substances recreationally? No     Social History     Tobacco Use    Smoking status: Never    Smokeless tobacco: Never   Vaping Use    Vaping status: Never Used   Substance Use Topics    Alcohol use: No    Drug use: No           5/31/2022   LAST FHS-7 RESULTS   1st degree relative breast or ovarian cancer No   Any relative bilateral breast cancer No   Any male have breast cancer No   Any ONE woman have BOTH breast AND ovarian cancer No   Any woman with breast cancer before 50yrs No   2 or more relatives with breast AND/OR ovarian cancer No   2 or more relatives with breast AND/OR bowel cancer No              Reviewed and updated as needed this visit by Provider                 "    Current providers sharing in care for this patient include:  Patient Care Team:  Willie Soto DO as PCP - General (Family Medicine)  Ravi Flores MD as MD (Cardiovascular Disease)  Ravi Flores MD as MD (Cardiovascular Disease)  Willie Soto DO as Assigned PCP    The following health maintenance items are reviewed in Epic and correct as of today:  Health Maintenance   Topic Date Due    DTAP/TDAP/TD IMMUNIZATION (1 - Tdap) Never done    Pneumococcal Vaccine: 50+ Years (1 of 1 - PCV) Never done    ZOSTER IMMUNIZATION (1 of 2) Never done    RSV VACCINE (1 - 1-dose 75+ series) Never done    INFLUENZA VACCINE (1) 09/01/2024    COVID-19 Vaccine (4 - 2024-25 season) 09/01/2024    MEDICARE ANNUAL WELLNESS VISIT  11/16/2024    ANNUAL REVIEW OF HM ORDERS  11/16/2024    CMP  04/25/2025    LIPID  04/25/2025    MICROALBUMIN  04/25/2025    TSH W/FREE T4 REFLEX  04/25/2025    CBC  04/25/2025    VITAMIN B12  04/25/2025    VITAMIN D  04/25/2025    FALL RISK ASSESSMENT  03/18/2026    ADVANCE CARE PLANNING  07/17/2029    DEXA  06/21/2031    PHQ-2 (once per calendar year)  Completed    URINALYSIS  Completed    HPV IMMUNIZATION  Aged Out    MENINGITIS IMMUNIZATION  Aged Out    BMP  Discontinued    HEMOGLOBIN  Discontinued         Review of Systems  Constitutional, HEENT, cardiovascular, pulmonary, gi and gu systems are negative, except as otherwise noted.     Objective    Exam  There were no vitals taken for this visit.   Estimated body mass index is 28.41 kg/m  as calculated from the following:    Height as of 7/16/24: 1.676 m (5' 6\").    Weight as of 7/16/24: 79.8 kg (176 lb).    Physical Exam  GENERAL: alert and no distress  PSYCH: mentation appears normal, affect normal/bright          3/18/2025   Mini Cog   Clock Draw Score 0 Abnormal   3 Item Recall 1 object recalled   Mini Cog Total Score 1        Video-Visit Details    Type of service:  Video Visit   Video End Time:2:28 PM  Originating Location " (pt. Location): Home    Distant Location (provider location):  On-site  Platform used for Video Visit: Ángel  Signed Electronically by: Willie Soto DO

## 2025-04-05 NOTE — PROGRESS NOTES
"Subjective     Barb Marie is a 80 year old female who presents to clinic today for the following health issues:    HPI    In the clinic for evaluation of bilateral leg swelling .  Patient describes she has noticed swelling in bilateral legs left more than right since 3 days .  Patient denies any new symptoms of chest pain or shortness of breath .  Patient denies any pain in leg .    Overall very healthy and not on any long term medication .         Musculoskeletal problem/pain- Leg Swelling   Onset/Duration: x 3 days ago  Description  Location: foot and ankles - bilateral  Swelling: YES  Redness: no  Pain: no  Warmth: no  Intensity:  mild  Progression of Symptoms:  same  Accompanying signs and symptoms:   Fevers: no  Numbness/tingling/weakness: no  History  Trauma to the area: no  Recent illness:  no  Previous similar problem: no  Previous evaluation:  no  Precipitating or alleviating factors:  Aggravating factors include: none  Therapies tried and outcome: nothing        Review of Systems   Constitutional: Negative for fatigue and fever.   Respiratory: Negative for cough and shortness of breath.    Gastrointestinal: Negative for nausea.   Musculoskeletal: Negative for back pain.   Skin: Negative for color change.   Neurological: Negative for headaches.        Objective    /78   Pulse 70   Temp 98.1  F (36.7  C)   Ht 1.702 m (5' 7\")   Wt 78.5 kg (173 lb)   SpO2 95%   BMI 27.10 kg/m    Body mass index is 27.1 kg/m .  Physical Exam  Vitals signs and nursing note reviewed.   HENT:      Head: Normocephalic.      Nose: Nose normal.      Mouth/Throat:      Mouth: Mucous membranes are moist.   Neck:      Musculoskeletal: Normal range of motion.   Cardiovascular:      Rate and Rhythm: Normal rate and regular rhythm.      Pulses: Normal pulses.   Pulmonary:      Effort: Pulmonary effort is normal.   Abdominal:      General: Abdomen is flat.      Palpations: Abdomen is soft.   Musculoskeletal:         " "General: No swelling or tenderness.      Right lower leg: Edema present.      Left lower leg: Edema present.   Neurological:      Mental Status: She is alert.          Assessment & Plan     Bilateral leg edema  Venous stasis  - mild bilateral lower leg edema   -likely secondary to venus stasis   - recommend to keep legs elevated   - recommend to use below knee pressure stockings .  - we discussed to reduce salt in diet .    Explain patient to contact back if symptoms fail to improve .  We can consider starting her on lasix .  We can also consider obtaining echo if symptoms fail to improve .           BMI:   Estimated body mass index is 27.1 kg/m  as calculated from the following:    Height as of this encounter: 1.702 m (5' 7\").    Weight as of this encounter: 78.5 kg (173 lb).            Return in about 4 weeks (around 12/22/2020) for Routine preventive.    Cait Paulino MD  [facility] [place]    " no

## 2025-06-05 ENCOUNTER — E-VISIT (OUTPATIENT)
Dept: URGENT CARE | Facility: CLINIC | Age: 85
End: 2025-06-05
Payer: COMMERCIAL

## 2025-06-05 DIAGNOSIS — H10.33 ACUTE BACTERIAL CONJUNCTIVITIS OF BOTH EYES: Primary | ICD-10-CM

## 2025-06-05 RX ORDER — OFLOXACIN 3 MG/ML
SOLUTION/ DROPS OPHTHALMIC
Qty: 5 ML | Refills: 0 | Status: SHIPPED | OUTPATIENT
Start: 2025-06-05 | End: 2025-06-12

## 2025-06-05 NOTE — PATIENT INSTRUCTIONS
Barb Marie,    Your photo and description of symptoms are consistent with pink eye caused by a bacteria. I sent in a prescription for an antibiotic eye drop to your pharmacy. Use these as directed, in both eyes to help the infection clear.     This can cause redness, irritation and itching in your eye as well as thickened discharge. Your eyes may be stuck shut when when you wake up in the morning. Your eyelids may also become swollen. You'll be contagious while you have discharge, drainage and crusting in your eyes, generally 7-10 days. Wash your hands frequently and avoid touching your eyes to prevent spreading to others.     You may use over the counter lubricating eye drops to help ease your symptoms. Allergy eye drops such as Patanol (olopatadine) or Zaditor (ketotifen) will help to control the itching. Avoid redness reducing eye drops for an extended period of time as these can cause a rebound and make the redness and irritation return when you stop using the drops. Cool packs on your eyes can help with irritation and swelling.     If your symptoms are getting worse or not improving by the 10th day of symptoms, be seen in person for further evaluation.    You'll be feeling better soon!    Jennifer Tesfaye PA-C  [facility]    Pinkeye: Care Instructions  Overview     Pinkeye is redness and swelling of the eye surface and the conjunctiva (the lining of the eyelid and the covering of the white part of the eye). Pinkeye is also called conjunctivitis. Pinkeye is often caused by infection with bacteria or a virus. Dry air, allergies, smoke, and chemicals are other common causes.  Pinkeye often gets better on its own in 7 to 10 days. Antibiotics only help if the pinkeye is caused by bacteria. Pinkeye caused by infection spreads easily. If an allergy or chemical is causing pinkeye, it will not go away unless you can avoid whatever is causing it.  Follow-up care is a key part of your treatment  and safety. Be sure to make and go to all appointments, and call your doctor if you are having problems. It's also a good idea to know your test results and keep a list of the medicines you take.  How can you care for yourself at home?  Wash your hands often. Always wash them before and after you treat pinkeye or touch your eyes or face.  Use moist cotton or a clean, wet cloth to remove crust. Wipe from the inside corner of the eye to the outside. Use a clean part of the cloth for each wipe.  Put cold or warm wet cloths on your eye a few times a day if the eye hurts.  Do not wear contact lenses or eye makeup until the pinkeye is gone. Throw away any eye makeup you were using when you got pinkeye. Clean your contacts and storage case. If you wear disposable contacts, use a new pair when your eye has cleared and it is safe to wear contacts again.  If the doctor gave you antibiotic ointment or eyedrops, use them as directed. Use the medicine for as long as instructed, even if your eye starts looking better soon. Keep the bottle tip clean, and do not let it touch the eye area.  To put in eyedrops or ointment:  Tilt your head back, and pull your lower eyelid down with one finger.  Drop or squirt the medicine inside the lower lid.  Close your eye for 30 to 60 seconds to let the drops or ointment move around.  Do not touch the ointment or dropper tip to your eyelashes or any other surface.  Do not share towels, pillows, or washcloths while you have pinkeye.  When should you call for help?   Call your doctor now or seek immediate medical care if:    You have pain in your eye, not just irritation on the surface.     You have a change in vision or loss of vision.     You have an increase in discharge from the eye.     Your eye has not started to improve or begins to get worse within 48 hours after you start using antibiotics.     Pinkeye lasts longer than 7 days.   Watch closely for changes in your health, and be sure to  "contact your doctor if you have any problems.  Where can you learn more?  Go to https://www.[employer].net/patiented  Enter Y392 in the search box to learn more about \"Lauriee: Care Instructions.\"  Current as of: July 31, 2024  Content Version: 14.4 2024-2025 [employer].   Care instructions adapted under license by your healthcare professional. If you have questions about a medical condition or this instruction, always ask your healthcare professional. [employer] disclaims any warranty or liability for your use of this information.    "

## 2025-06-18 ENCOUNTER — TELEPHONE (OUTPATIENT)
Dept: FAMILY MEDICINE | Facility: CLINIC | Age: 85
End: 2025-06-18
Payer: COMMERCIAL

## 2025-06-18 NOTE — TELEPHONE ENCOUNTER
Home Health Care      Reason for call:      [facility] - Resident admitting to facility - attached orders // make changes if necessary.    Orders are needed for this patient.  above    Pt Provider: Dr. Soto    Phone Number Homecare Nurse can be reached at: fax       Could we send this information to you in [employer] or would you prefer to receive a phone call?:   na    Put in providers in box    Gisela K     Negative

## 2025-06-19 ENCOUNTER — TELEPHONE (OUTPATIENT)
Dept: FAMILY MEDICINE | Facility: CLINIC | Age: 85
End: 2025-06-19
Payer: COMMERCIAL

## 2025-06-19 NOTE — TELEPHONE ENCOUNTER
Dorita calling from [facility].    Wondering if provider would be willing to write referral for PT and OT orders.    Advised pt will need office visit as been over 90 days since previous OV. Dorita will notify facility that pt is at.    Staci KENNY, RN, PHN

## 2025-06-27 ENCOUNTER — TRANSFERRED RECORDS (OUTPATIENT)
Dept: HEALTH INFORMATION MANAGEMENT | Facility: CLINIC | Age: 85
End: 2025-06-27
Payer: COMMERCIAL

## 2025-06-27 ENCOUNTER — TELEPHONE (OUTPATIENT)
Dept: FAMILY MEDICINE | Facility: CLINIC | Age: 85
End: 2025-06-27
Payer: COMMERCIAL

## 2025-06-27 NOTE — TELEPHONE ENCOUNTER
Home Health Care      Reason for call:  [facility] - rolled out of bed - incident in resident room    Orders are needed for this patient.  above    Pt Provider: Dr. Soto    Phone Number Homecare Nurse can be reached at: fax [phone]      Could we send this information to you in [facility] or would you prefer to receive a phone call?:   na    Put in providers in box     Gisela K

## 2025-07-01 NOTE — TELEPHONE ENCOUNTER
Faxed signed forms to [facility] #[phone]    Fall detail drawn out    Copied into [facility] / Filed in [place]

## 2025-07-10 ENCOUNTER — TELEPHONE (OUTPATIENT)
Dept: FAMILY MEDICINE | Facility: CLINIC | Age: 85
End: 2025-07-10
Payer: COMMERCIAL

## 2025-07-10 ENCOUNTER — MEDICAL CORRESPONDENCE (OUTPATIENT)
Dept: HEALTH INFORMATION MANAGEMENT | Facility: CLINIC | Age: 85
End: 2025-07-10
Payer: COMMERCIAL

## 2025-07-10 NOTE — TELEPHONE ENCOUNTER
Forms/Letter Request    Type of form/letter: Nursing Home/Assisted Living Orders  [place]  Do we have the form/letter: Yes: given to Dr Soto    Who is the form from? Home care    Where did/will the form come from? form was faxed in    When is form/letter needed by: when available    How would you like the form/letter returned: Fax : [phone]

## 2025-07-10 NOTE — TELEPHONE ENCOUNTER
Form from [facility] signed by Dr Soto  Faxed back to [phone]  Sent to [facility]  And filed in [facility].